# Patient Record
Sex: FEMALE | Race: WHITE | Employment: FULL TIME | URBAN - METROPOLITAN AREA
[De-identification: names, ages, dates, MRNs, and addresses within clinical notes are randomized per-mention and may not be internally consistent; named-entity substitution may affect disease eponyms.]

---

## 2019-02-05 ENCOUNTER — OFFICE VISIT (OUTPATIENT)
Dept: FAMILY MEDICINE CLINIC | Age: 44
End: 2019-02-05

## 2019-02-05 VITALS
SYSTOLIC BLOOD PRESSURE: 116 MMHG | RESPIRATION RATE: 16 BRPM | HEIGHT: 67 IN | OXYGEN SATURATION: 99 % | BODY MASS INDEX: 22.13 KG/M2 | HEART RATE: 72 BPM | TEMPERATURE: 97.1 F | DIASTOLIC BLOOD PRESSURE: 73 MMHG | WEIGHT: 141 LBS

## 2019-02-05 DIAGNOSIS — G89.29 CHRONIC BILATERAL LOW BACK PAIN WITHOUT SCIATICA: Primary | ICD-10-CM

## 2019-02-05 DIAGNOSIS — M70.62 TROCHANTERIC BURSITIS OF LEFT HIP: ICD-10-CM

## 2019-02-05 DIAGNOSIS — N92.1 METRORRHAGIA: ICD-10-CM

## 2019-02-05 DIAGNOSIS — Z13.220 SCREENING CHOLESTEROL LEVEL: ICD-10-CM

## 2019-02-05 DIAGNOSIS — M54.50 CHRONIC BILATERAL LOW BACK PAIN WITHOUT SCIATICA: Primary | ICD-10-CM

## 2019-02-05 RX ORDER — PREDNISONE 20 MG/1
TABLET ORAL
Qty: 21 TAB | Refills: 0 | Status: SHIPPED | OUTPATIENT
Start: 2019-02-05

## 2019-02-05 NOTE — PROGRESS NOTES
Subjective:     HPI:  David Ruffin is a new patient who presents today to establish care. Medical problems: allergies, chronic back pain, hip pain  Last visit with PCP: about 5 years ago  Last labs: about 5 years ago  Last PAP: 9 years ago  Hx of abnormal pap smears: none  Patient's last menstrual period was 01/29/2019. Family history of GYN cancer: mother had benign tumors in her vagina  Family history of breast cancer: maternal aunt (diagnosed around age 48), maternal great grandmother  Last mammogram: none  Family history of colon cancer: none  Last colonoscopy: none  Tobacco use: pt reports that she smoked 0.5 pack per day and switched to vaping 5 years ago  Drug use: smokes marijuana every day  Alcohol use: drinks 3 beers 2 times per week    Patient complains of left hip pain and lower back pain. Left hip pain: Pt reports that she has been experiencing left hip pain recently. She states that she has a lump on her left hip that is painful. She reports that the pain radiates down her glute and hamstring to her knee. Lower back pain: Pt complains of chronic pain that started about 10 years ago. She states that she manages her back pain herself. She reports that when she stands or sits for an extended period of time her back pain is aggravated. She reports that her mother also has back pain. Referred to physical therapy. Of note, pt reports that she is physically active. She states that she lifts weights, does pilates, and rides her bike. Blood work ordered today. Current Outpatient Medications   Medication Sig Dispense Refill    Cetirizine (ZYRTEC) 10 mg cap Take  by mouth.  fluticasone propionate (FLONASE ALLERGY RELIEF NA) by Nasal route.  predniSONE (DELTASONE) 20 mg tablet 3 tabs daily for 3 days, 2 tabs daily for 3 days, 1 tab daily for 3 days, 1/2 tab daily for 4 days. 21 Tab 0        No Known Allergies    No past medical history on file.      No past surgical history on file.    No family history on file. Social History     Socioeconomic History    Marital status:      Spouse name: Not on file    Number of children: Not on file    Years of education: Not on file    Highest education level: Not on file   Social Needs    Financial resource strain: Not on file    Food insecurity - worry: Not on file    Food insecurity - inability: Not on file    Transportation needs - medical: Not on file   [place] needs - non-medical: Not on file   Occupational History    Not on file   Tobacco Use    Smoking status: Former Smoker     Packs/day: 0.50     Years: 25.00     Pack years: 12.50     Types: Cigarettes    Smokeless tobacco: Never Used    Tobacco comment: patient vapes   Substance and Sexual Activity    Alcohol use: Yes     Frequency: 2-4 times a month     Drinks per session: 3 or 4     Binge frequency: Never    Drug use: Yes     Frequency: 7.0 times per week     Types: Marijuana    Sexual activity: Yes     Partners: Male     Birth control/protection: None   Other Topics Concern    Not on file   Social History Narrative    Not on file       REVIEW OF SYSTEM:  Review of Systems   Constitutional: Negative for chills and fever. Eyes: Negative for blurred vision. Respiratory: Negative for shortness of breath. Cardiovascular: Negative for chest pain, palpitations and leg swelling. Gastrointestinal: Negative for constipation, diarrhea, nausea and vomiting. Musculoskeletal: Positive for back pain and joint pain. Neurological: Negative for headaches. Objective:     Visit Vitals  /73 (BP 1 Location: Left arm, BP Patient Position: Sitting)   Pulse 72   Temp 97.1 °F (36.2 °C) (Oral)   Resp 16   Ht 5' 6.5\" (1.689 m)   Wt 141 lb (64 kg)   LMP 01/29/2019   SpO2 99%   BMI 22.42 kg/m²       PHYSICAL EXAM:  Physical Exam   Constitutional: She is oriented to person, place, and time and well-developed, well-nourished, and in no distress.    HENT:   Right Ear: Tympanic membrane, external ear and ear canal normal.   Left Ear: Tympanic membrane, external ear and ear canal normal.   Nose: Nose normal.   Mouth/Throat: Oropharynx is clear and moist.   Eyes: Pupils are equal, round, and reactive to light. Neck: Normal range of motion. Neck supple. No thyromegaly present. Cardiovascular: Normal rate, regular rhythm and intact distal pulses. Murmur heard. Pulmonary/Chest: Effort normal and breath sounds normal. She has no wheezes. Musculoskeletal:        Left hip: She exhibits swelling (bursa). Neurological: She is alert and oriented to person, place, and time. Skin: Skin is warm and dry. Vitals reviewed. Assessment/Plan:       ICD-10-CM ICD-9-CM    1. Chronic bilateral low back pain without sciatica M54.5 724.2 XR SPINE LUMB 2 OR 3 V    G89.29 338.29 REFERRAL TO PHYSICAL THERAPY      predniSONE (DELTASONE) 20 mg tablet   2. Trochanteric bursitis of left hip M70.62 726.5 REFERRAL TO PHYSICAL THERAPY      predniSONE (DELTASONE) 20 mg tablet   3. Screening cholesterol level Z13.220 V77.91 LIPID PANEL      METABOLIC PANEL, COMPREHENSIVE   4. Metrorrhagia N92.1 626.6 CBC WITH AUTOMATED DIFF     Patient given opportunity to ask questions. Questions answered. Pt advised to ice her left hip after exercise. Patient understands plan of care. Follow-up Disposition:  Return in about 6 weeks (around 3/19/2019) for well woman with PAP. Caron Nelson Written by Sunny Gillespie, as dictated by Toshia Hearn DO.    I, Dr. Toshia Hearn, confirm that all documentation is accurate.

## 2019-02-05 NOTE — PROGRESS NOTES
Blaise Ortega is a 44 y.o. female  Chief Complaint   Patient presents with    Establish Care    Hip Pain     left     1. Have you been to the ER, urgent care clinic since your last visit? Hospitalized since your last visit? No    2. Have you seen or consulted any other health care providers outside of the [address] [place] [address] since your last visit? Include any pap smears or colon screening.  No

## 2019-02-06 LAB
ALBUMIN SERPL-MCNC: 4.6 G/DL (ref 3.5–5.5)
ALBUMIN/GLOB SERPL: 2.1 {RATIO} (ref 1.2–2.2)
ALP SERPL-CCNC: 47 IU/L (ref 39–117)
ALT SERPL-CCNC: 11 IU/L (ref 0–32)
AST SERPL-CCNC: 14 IU/L (ref 0–40)
BASOPHILS # BLD AUTO: 0 X10E3/UL (ref 0–0.2)
BASOPHILS NFR BLD AUTO: 0 %
BILIRUB SERPL-MCNC: 0.5 MG/DL (ref 0–1.2)
BUN SERPL-MCNC: 9 MG/DL (ref 6–24)
BUN/CREAT SERPL: 15 (ref 9–23)
CALCIUM SERPL-MCNC: 9.2 MG/DL (ref 8.7–10.2)
CHLORIDE SERPL-SCNC: 105 MMOL/L (ref 96–106)
CHOLEST SERPL-MCNC: 168 MG/DL (ref 100–199)
CO2 SERPL-SCNC: 21 MMOL/L (ref 20–29)
CREAT SERPL-MCNC: 0.6 MG/DL (ref 0.57–1)
EOSINOPHIL # BLD AUTO: 0 X10E3/UL (ref 0–0.4)
EOSINOPHIL NFR BLD AUTO: 1 %
ERYTHROCYTE [DISTWIDTH] IN BLOOD BY AUTOMATED COUNT: 13 % (ref 12.3–15.4)
GLOBULIN SER CALC-MCNC: 2.2 G/DL (ref 1.5–4.5)
GLUCOSE SERPL-MCNC: 91 MG/DL (ref 65–99)
HCT VFR BLD AUTO: 39 % (ref 34–46.6)
HDLC SERPL-MCNC: 61 MG/DL
HGB BLD-MCNC: 13.7 G/DL (ref 11.1–15.9)
IMM GRANULOCYTES # BLD AUTO: 0 X10E3/UL (ref 0–0.1)
IMM GRANULOCYTES NFR BLD AUTO: 0 %
LDLC SERPL CALC-MCNC: 93 MG/DL (ref 0–99)
LYMPHOCYTES # BLD AUTO: 1.2 X10E3/UL (ref 0.7–3.1)
LYMPHOCYTES NFR BLD AUTO: 24 %
MCH RBC QN AUTO: 31.5 PG (ref 26.6–33)
MCHC RBC AUTO-ENTMCNC: 35.1 G/DL (ref 31.5–35.7)
MCV RBC AUTO: 90 FL (ref 79–97)
MONOCYTES # BLD AUTO: 0.3 X10E3/UL (ref 0.1–0.9)
MONOCYTES NFR BLD AUTO: 5 %
NEUTROPHILS # BLD AUTO: 3.6 X10E3/UL (ref 1.4–7)
NEUTROPHILS NFR BLD AUTO: 70 %
PLATELET # BLD AUTO: 315 X10E3/UL (ref 150–379)
POTASSIUM SERPL-SCNC: 4.7 MMOL/L (ref 3.5–5.2)
PROT SERPL-MCNC: 6.8 G/DL (ref 6–8.5)
RBC # BLD AUTO: 4.35 X10E6/UL (ref 3.77–5.28)
SODIUM SERPL-SCNC: 143 MMOL/L (ref 134–144)
SPECIMEN STATUS REPORT, ROLRST: NORMAL
TRIGL SERPL-MCNC: 69 MG/DL (ref 0–149)
VLDLC SERPL CALC-MCNC: 14 MG/DL (ref 5–40)
WBC # BLD AUTO: 5.1 X10E3/UL (ref 3.4–10.8)

## 2019-02-13 ENCOUNTER — HOSPITAL ENCOUNTER (OUTPATIENT)
Dept: PHYSICAL THERAPY | Age: 44
Discharge: HOME | End: 2019-02-13
Payer: COMMERCIAL

## 2019-02-13 PROCEDURE — 97110 THERAPEUTIC EXERCISES: CPT

## 2019-02-13 PROCEDURE — 97162 PT EVAL MOD COMPLEX 30 MIN: CPT

## 2019-02-13 NOTE — PROGRESS NOTES
[address] [facility] PHYSICAL THERAPY 
[address] [facility] [address], [place], [address] - Phone: [phone]  Fax: [phone] PLAN OF CARE / STATEMENT OF MEDICAL NECESSITY FOR PHYSICAL THERAPY SERVICES Patient Name: Sherman Frazier : 1975 Medical  
Diagnosis: Low back pain [M54.5] Trochanteric bursitis of left hip [M70.62] Treatment Diagnosis: Low back pain [M54.5] Trochanteric bursitis of left hip [M70.62] Onset Date: Low back pain - chronic; hip pain - 2 months ago Referral Source: Andrew Perdue DO Start of Care [facility]): 2019 Prior Hospitalization: See medical history Provider #: [id number] Prior Level of Function: Independent with ADLs, ambulation; working; exercising regularly (yoga, pilates, biking, hiking) Comorbidities: H/o breast implants Medications: Verified on Patient Summary List  
The Plan of Care and following information is based on the information from the initial evaluation.  
=========================================================================================== Assessment / key information:  Patient is a 44 y.o. female who presents with complaints of chronic low back pain, recent (~ 2 months ago) onset of B hip pain (left > right). Patient reports that she had swelling over trochanteric bursa, which improved with oral steroids. Patient demonstrates pain with L/S ROM, decreased core/B hip strength, decreased B hip flexibility, pelvic obliquities (right anterior innominate rotation) and impaired position/activity tolerance. Patient would benefit from skilled PT services to address these issues and improve function. Thank you for this referral. 
 
FOTO: 57/100, stage 4, little difficulty performing usual work or household activities ========================================================================================== Eval Complexity: History: LOW Complexity : Zero comorbidities / personal factors that will impact the outcome / POCExam:HIGH Complexity : 4+ Standardized tests and measures addressing body structure, function, activity limitation and / or participation in recreation  Presentation: MEDIUM Complexity : Evolving with changing characteristics  Clinical Decision Making:MEDIUM Complexity : FOTO score of 26-74Overall Complexity:MEDIUM Problem List: pain affecting function, decrease ROM, decrease strength, impaired gait/ balance, decrease ADL/ functional abilitiies, decrease activity tolerance, decrease flexibility/ joint mobility and decrease transfer abilities Treatment Plan may include any combination of the following: Therapeutic exercise, Therapeutic activities, Neuromuscular re-education, Physical agent/modality, Gait/balance training, Manual therapy, Patient education, Functional mobility training, Home safety training and Stair training Patient / Family readiness to learn indicated by: asking questions, trying to perform skills and interest 
Persons(s) to be included in education: patient (P) Barriers to Learning/Limitations: None Measures taken:   
Patient Goal (s): \"Ease lower back pain, exercise correctly, sit correctly. \"  
Patient self reported health status: good Rehabilitation Potential: good ? Short Term Goals: To be accomplished in  2-3  weeks: 1. Patient will demonstrate compliance with HEP. 2. Patient will report less than or equal to 4/10 pain at worst to allow increased activity tolerance. ? Long Term Goals: To be accomplished in  4-6  weeks: 1. Patient will demonstrate independence with HEP. 2. Patient will score greater than or equal to 69/100 (stage 4, little difficulty performing usual work or household activities) on FOTO to indicate improved function. 3. Patient will demonstrate B glut max strength > 5/5 in to facilitate gait and functional mobility. Frequency / Duration:   Patient to be seen  2  times per week for 4-6  weeks: Patient / Caregiver education and instruction: activity modification and exercises Therapist Signature: Dez Haddad PT Date: 2/13/2019 Certification Period: NA Time: 6:12 PM  
=========================================================================================== I certify that the above Physical Therapy Services are being furnished while the patient is under my care. I agree with the treatment plan and certify that this therapy is necessary. Physician Signature:       Date:      Time:  Please sign and return to [facility] or you may fax the signed copy to [phone]. Thank you.

## 2019-02-13 NOTE — PROGRESS NOTES
PHYSICAL THERAPY - DAILY TREATMENT NOTE Patient Name: Janine Steele        Date: 2019 : 1975   YES Patient  Verified Visit #:   1     Insurance: Payor: Prateek Ruiz / Plan: Med David / Product Type: HMO /   
 
In time: 4:00 Out time: 5:00 Total Treatment Time: 60 Medicare/BCBS [place] Time Tracking (below) Total Timed Codes (min):  25 1:1 Treatment Time:  25 TREATMENT AREA =  LBP 
SUBJECTIVE Pain Level (on 0 to 10 scale):  6  / 10 Left SI/buttock Medication Changes/New allergies or changes in medical history, any new surgeries or procedures? NO    If yes, update Summary List  
Subjective Functional Status/Changes:  []  No changes reported Pt reports LBP for 10+ years. Pt reports gradual onset, states that she thinks it may have been exercise-related (core exercises) and sitting for prolonged periods at work. Pt reports that she began having hip pain (left>right) ~ 2 months ago, which became progressively worse. Pt reports that she had swelling over bursa, which has improved since she had dose of oral steroids. Pt denies numbness/tingling/weakness. Pt reports intermittent pain B gluts/hamstrings, but reports that she does not have pain below knees. Pt reports that prolonged sitting or standing increase pain. Pt reports temporary relief with massage. Pt reports that she went to chiropractor for ~ 5 years, got some relief but no longer gets relief. OBJECTIVE Physical Therapy Evaluation - Lumbar Spine ([facility]) SUBJECTIVE Chief Complaint: See above Mechanism of injury: See above Symptoms: 
Pain rating (0-10): Today: 6 Best: 0 Worst: 10 
 [] Constant:  
 [] Intermittent: 
  
Aggravated by: 
 [x] Bending [x] Sitting [x] Standing [] Walking 
 [] Moving [] Cough [] Sneeze [] Valsalva 
 [] AM  [] PM  Lying:  [x] sup   [] pro   [] sidelying 
 [] Other: 
  
Eased by:  
 [] Bending [] Sitting [] Standing [x] Walking [x] Moving [] AM  [x] PM  Lying: [] sup  [x] pro  [x] sidelying 
 [x] Other: exercise, massage General Health:  Red Flags Indicated? [] Yes    [x] No 
[] Yes [] No Recent weight change (If yes, due to dieting? [] Yes  [] No) [] Yes [] No Weakness in legs during walking 
[] Yes [] No Unremitting pain at night 
[] Yes [] No Abdominal pain or problems 
[] Yes [] No Rectal bleeding 
[] Yes [] No Feet more cold or painful in cold weather 
[] Yes [] No Menstrual irregularities 
[] Yes [] No Blood or pain with urination 
[] Yes [] No Dysfunction of bowel or bladder 
[] Yes [] No Recent illness within past 3 weeks (i.e, cold, flu) 
[] Yes [] No Numbness/tingling in buttock/genitalia region Past History/Treatments: Chiropractic Diagnostic Tests: [] Lab work [] X-rays    [] CT [] MRI     [] Other: 
Results: None Functional Status Prior level of function: Working (desk job), exercising (pilates, yoga, biking, hiking - swimming, kayaking in summer) Present functional limitations: Pain with prolonged sitting What position do you sleep in?: Sidelying or prone OBJECTIVEPosture: 
Lateral Shift: [] R    [] L     [] +  [x] - Kyphosis: [x] Increased [] Decreased   []  WNL Lordosis:  [x] Increased [] Decreased   [] WNL Pelvic symmetry: [] WNL    [x] Other: See below Gait:  [x] Normal     [] Abnormal: 
 
Active Movements: [] N/A   [] Too acute   [] Other: ROM % AROM % PROM Comments:pain, area Forward flexion 40-60 WFL  LBP Extension 20-30 WFL  LBP  
SB right 20-30 WFL  Stretching sensation left trunk SB left 20-30 WFL  Stretching sensation right trunk Rotation right 5-10 WFL  Stretching sensation left trunk Rotation left 5-10 WFL  Stretching sensation right trunk Repeated Movements Effects on present pain: produces (ID), abolishes (A), increases (incr), decreases (decr), centralizes (C), peripheral (PH), no effect (NE)  Pre-Test Sx Flexion Repeated Flexion Extension Repeated Extension Repeated SBL Repeated SBR Sitting Standing Lying      N/A N/A Comments: 
Side Glide: 
Sustained passive positioning test: 
 
Neuro Screen [x] WNL Myotome/Dermatome/Reflexes: 
Comments: 
 
Dural Mobility: SLR Sitting: [] R    [] L    [] +    [] -  @ (degrees):  
        Supine: [] R    [] L    [] +    [x] -  @ (degrees):  
Slump Test: [] R    [] L    [] +    [] -  @ (degrees): Prone Knee Bend: [] R    [] L    [] +    [] -  
 
Palpation [] Min  [] Mod  [] Severe    Location: 
[] Min  [] Mod  [] Severe    Location: 
[] Min  [] Mod  [] Severe    Location: 
 
Stabilization Tests Multifidus Test 
Level 1: Prone abdominal draw in (Goal 6-10mmHG): 
Level 2: Supported leg load supine (needle deflection at 40mmHG): [] Yes  [] No  
Level 3: Unsupported leg load supine (needle deflection at 40mmHG): [] Yes  [] No 
  
Strength 
 L(0-5) R (0-5) N/T Hip Flexion (L1,2) 4 4 [] Knee Extension (L3,4) 5 5 [] Ankle Dorsiflexion (L4) 5 5 [] Great Toe Extension (L5) 5 5 [] Ankle Plantarflexion (S1) 5 5 [] Knee Flexion (S1,2) 5 5 [] Upper Abdominals   [] Lower Abdominals   [] Paraspinals   [] Back Rotators   [] Gluteus Lc 4 4 [] Other (Glut Med) 4 5 [] Special Tests Lumbar:  Lumb. Compression: [] Pos  [] Neg            
  Lumbar Distraction:   [] Pos  [] Neg 
  Quadrant:  [] Pos  [] Neg   [] Flex  [] Ext Sacroilliac:  Gaenslen's: [] R    [] L    [] +    [] -  
  Compression: [] +    [] -  
  Gapping:  [] +    [] - Thigh Thrust: [] R    [] L    [] +    [] - Leg Length: [] +    [] -   Position: 
  Crests: 
  ASIS: Left higher in supine PSIS: Left lower in prone Sacral Sulcus: 
  Mobility: Standing flex: + right Sitting flex: + right Supine to sit: Left LE longer in supine to shorter in long sit Prone knee bend: 
 
     Hip: Doe Miracle:  [] R    [] L    [] +    [] - Scour:  [] R    [] L    [] +    [] -   Piriformis: [] R    [x] L    [x] +    [] -  
 
 Deficits: Sadie's: [x] R    [x] L    [x] +    [] - Jr: [x] R    [x] L    [x] +    [] - Hamstrings 90/90: WNL  Gastrocsoleus (to neutral): Right: Left: 
    
Global Muscular Weakness: 
Abdominals: 
Quadratus Lumborum: 
Paraspinals: Other: 
 
Other tests/comments: 
Stork + right 25 min Therapeutic Exercise:  [x]  See flow sheet Rationale:      increase ROM, increase strength, improve coordination, improve balance and increase proprioception to improve the patients ability to perform ADLs/IADLs, functional mobility and gait safely and independently without increased pain/symptoms During TE min Patient Education:  YES  Reviewed HEP [x]  Progressed/Changed HEP based on:   Initiated HEP (copy in chart) Other Objective/Functional Measures: 
 
See eval 
 
Pubic clearing technique, MET for correction of right anterior innominate rotation, self MET for correction of right anterior innominate rotation - improved but not completely corrected innominate rotation afterward H/L hip abd/add, piriformis stretch, REIL, JAM Post Treatment Pain Level (on 0 to 10) scale:   5  / 10 ASSESSMENT Assessment/Changes in Function:  
See plan of care Justification for Eval Code Complexity: 
Patient History : LOW Examination HIGH - See objective Clinical Presentation: MEDIUM Clinical Decision Making : MEDIUM - FOTO 57/100 (lumbar spine), 59/100 (hip) []  See Progress Note/Recertification Patient will continue to benefit from skilled PT services: see plan of care Progress toward goals / Updated goals: 
See plan of care PLAN [x]  Upgrade activities as tolerated YES Continue plan of care  
[]  Discharge due to :   
[]  Other:   
 
Therapist: Anahi Canales PT Date: 2/13/2019 Time: 4:00 PM

## 2019-02-18 ENCOUNTER — HOSPITAL ENCOUNTER (OUTPATIENT)
Dept: PHYSICAL THERAPY | Age: 44
Discharge: HOME | End: 2019-02-18
Payer: COMMERCIAL

## 2019-02-18 PROCEDURE — 97110 THERAPEUTIC EXERCISES: CPT

## 2019-02-18 NOTE — PROGRESS NOTES
PHYSICAL THERAPY - DAILY TREATMENT NOTE Patient Name: Melita Caballero        Date: 2019 : 1975   YES Patient  Verified Visit #:   2   of     Insurance: Payor: James Tolentino / Plan: Leeanna Cano / Product Type: HMO /   
 
In time: 8:28 Out time: 9:10 Total Treatment Time: 42 Medicare/BCBS [place] Time Tracking (below) Total Timed Codes (min):  42 1:1 Treatment Time:  42 TREATMENT AREA =  Low back pain [M54.5] Trochanteric bursitis, left hip [M70.62] SUBJECTIVE Pain Level (on 0 to 10 scale):  5  / 10 Medication Changes/New allergies or changes in medical history, any new surgeries or procedures? NO    If yes, update Summary List  
Subjective Functional Status/Changes:  []  No changes reported Pt reports constant LBP. Pt reports that she had left lateral hip pain last night while sleeping (which she attributes to lying on left hip) and while riding stationary bike at home this morning. OBJECTIVE 42 min Therapeutic Exercise:  [x]  See flow sheet Rationale:      increase ROM and increase strength to improve the patients ability to perform ADLs/IADLs, functional mobility and gait safely and independently without increased pain/symptoms During TE min Patient Education:  YES  Reviewed HEP [x]  Progressed/Changed HEP based on:   Cont HEP; added several new exercises (copy in chart) Other Objective/Functional Measures: 
 
Initiated bike, stand hip 3-way, QL stretch at step, bridge with ball, bridge with band, LTR with core, H/L toe taps # 1, 90/90 HS stretch, mod Jr stretch No innominate rotation apparent this session Patient reported pressure lower back and B SI region; patient reported NE with REIL, JAM or pubic clearing technique Post Treatment Pain Level (on 0 to 10) scale:    10 ASSESSMENT Assessment/Changes in Function: Tolerated exercises with minimal c/o increased pain 
  
[]  See Progress Note/Recertification Patient will continue to benefit from skilled PT services to modify and progress therapeutic interventions, address functional mobility deficits, address ROM deficits, address strength deficits, analyze and address soft tissue restrictions, analyze and cue movement patterns, analyze and modify body mechanics/ergonomics, assess and modify postural abnormalities and instruct in home and community integration to attain remaining goals. Progress toward goals / Updated goals: 
Progressing toward goals: · Short Term Goals: To be accomplished in  2-3  weeks: 1. Patient will demonstrate compliance with HEP. - Reports compliance 2. Patient will report less than or equal to 4/10 pain at worst to allow increased activity tolerance. · Long Term Goals: To be accomplished in  4-6  weeks: 1. Patient will demonstrate independence with HEP. 2. Patient will score greater than or equal to 69/100 (stage 4, little difficulty performing usual work or household activities) on FOTO to indicate improved function. 3. Patient will demonstrate B glut max strength > 5/5 in to facilitate gait and functional mobility. PLAN [x]  Upgrade activities as tolerated YES Continue plan of care  
[]  Discharge due to :   
[]  Other:   
 
Therapist: Zac Light PT Date: 2/18/2019 Time: 8:26 AM  
 
Future Appointments Date Time Provider Adolfo Silva 2/18/2019  8:30 AM Summer Blevins PT [facility]  
2/27/2019  8:00 AM Summer Blevins PT [facility]  
3/20/2019  8:20 AM Janice Ovalles DO [facility] SCHED

## 2019-02-27 ENCOUNTER — HOSPITAL ENCOUNTER (OUTPATIENT)
Dept: PHYSICAL THERAPY | Age: 44
Discharge: HOME | End: 2019-02-27
Payer: COMMERCIAL

## 2019-02-27 PROCEDURE — 97110 THERAPEUTIC EXERCISES: CPT

## 2019-02-27 NOTE — PROGRESS NOTES
PHYSICAL THERAPY - DAILY TREATMENT NOTE Patient Name: Melita Caballero        Date: 2019 : 1975   YES Patient  Verified Visit #:   3   of     Insurance: Payor: James Tolentino / Plan: Leeanna Cano / Product Type: HMO /   
 
In time: 8:03 Out time: 8:41 Total Treatment Time: 45 Medicare/BCBS [place] Time Tracking (below) Total Timed Codes (min):  38 1:1 Treatment Time:  45 TREATMENT AREA =  Low back pain [M54.5] Trochanteric bursitis, left hip [M70.62] SUBJECTIVE Pain Level (on 0 to 10 scale):  5  / 10 Medication Changes/New allergies or changes in medical history, any new surgeries or procedures? NO    If yes, update Summary List  
Subjective Functional Status/Changes:  []  No changes reported Pt reports that she continues to have a dull ache in lumbar region, but that she has noticed that she is having less pain with JAM. Pt reports that modified Jr stretch does not work on her bed because it is too low. OBJECTIVE 38 min Therapeutic Exercise:  [x]  See flow sheet Rationale:      increase ROM and increase strength to improve the patients ability to perform ADLs/IADLs, functional mobility and gait safely and independently without increased pain/symptoms During TE min Patient Education:  YES  Reviewed HEP [x]  Progressed/Changed HEP based on:   Cont HEP; added new exercises (copy in chart); advised to alternate SLR 3-way and standing hip 3-way Other Objective/Functional Measures: 
 
Exercises per flowsheet Initiated clam, SLR 3-way Post Treatment Pain Level (on 0 to 10) scale:   5  / 10 ASSESSMENT Assessment/Changes in Function: Tolerated exercises without c/o increased pain Decreased ROM with REIL 
  
[]  See Progress Note/Recertification Patient will continue to benefit from skilled PT services to modify and progress therapeutic interventions, address functional mobility deficits, address ROM deficits, address strength deficits, analyze and address soft tissue restrictions, analyze and cue movement patterns, analyze and modify body mechanics/ergonomics, assess and modify postural abnormalities and instruct in home and community integration to attain remaining goals. Progress toward goals / Updated goals: 
Progressing toward goals: · Short Term Goals: To be accomplished in  2-3  weeks: 1. Patient will demonstrate compliance with HEP. - Reports compliance with HEP 2. Patient will report less than or equal to 4/10 pain at worst to allow increased activity tolerance. · Long Term Goals: To be accomplished in  4-6  weeks: 1. Patient will demonstrate independence with HEP. 2. Patient will score greater than or equal to 69/100 (stage 4, little difficulty performing usual work or household activities) on FOTO to indicate improved function. 3. Patient will demonstrate B glut max strength > 5/5 in to facilitate gait and functional mobility. - Added prone hip ext 
   
PLAN [x]  Upgrade activities as tolerated YES Continue plan of care  
[]  Discharge due to :   
[]  Other:   
 
Therapist: Alexa Brown PT Date: 2/27/2019 Time: 8:09 AM  
 
Future Appointments Date Time Provider Adolfo Silva 3/4/2019  8:00 AM [facility] AV 1 [facility]  
3/13/2019  8:00 AM Rene Torres PT [facility]  
3/18/2019  8:30 AM Rene Torres PT [facility]  
3/20/2019  8:20 AM Keyon Ovalles DO [facility]  
3/27/2019  8:00 AM Rene Torres PT [facility]

## 2019-03-04 ENCOUNTER — HOSPITAL ENCOUNTER (OUTPATIENT)
Dept: PHYSICAL THERAPY | Age: 44
Discharge: HOME | End: 2019-03-04
Payer: COMMERCIAL

## 2019-03-04 PROCEDURE — 97140 MANUAL THERAPY 1/> REGIONS: CPT

## 2019-03-04 PROCEDURE — 97110 THERAPEUTIC EXERCISES: CPT

## 2019-03-04 NOTE — PROGRESS NOTES
PHYSICAL THERAPY - DAILY TREATMENT NOTE Patient Name: Arie Smith        Date: 3/4/2019 : 1975   yes Patient  Verified Visit #:   4     Insurance: Payor: BLUE CROSS / Plan: Guillermo Dimas / Product Type: HMO /   
 
In time: 800 Out time: 9:10 Total Treatment Time: 70 Medicare/[facility] Time Tracking (below) Total Timed Codes (min):  66 1:1 Treatment Time:  64 TREATMENT AREA =  Low back pain [M54.5] Trochanteric bursitis, left hip [M70.62] SUBJECTIVE Pain Level (on 0 to 10 scale):   10 Medication Changes/New allergies or changes in medical history, any new surgeries or procedures?    no  If yes, update Summary List  
Subjective Functional Status/Changes:  []  No changes reported Pt states pain is a little better. Notes ongoing \"bump\" at left lateral hip. Reports compliance with HEP. OBJECTIVE 
51 
(41) min Therapeutic Exercise:  [x]  See flow sheet Rationale:         increase ROM and increase strength to improve the patients ability to perform ADLs/IADLs, functional mobility and gait safely and independently without increased pain/symptoms 15 min Manual Therapy:  Prone b/l hip IR stretch, hip flexor stretch. METs for R ant innominate Rationale: decrease pain, increase ROM and increase tissue extensibility to tolerate standing/walking activities Billed With/As: 
 [x] TE 
 [] TA 
 [] Neuro 
 [] Self Care Patient Education: [x] Review HEP [] Progressed/Changed HEP based on:  
[] positioning   [] body mechanics   [] transfers   [] heat/ice application   
[] other:   
Other Objective/Functional Measures: 
 
SI joint check:  R ant rotated innominate 
 
-increased reps with hip hike 
-cues to increase TA activation with SLR flexion, 90-90 toe taps (poor+ stability with toe taps) 
-cues for form with S/L abd 
  
Post Treatment Pain Level (on 0 to 10) scale:   5  / 10 ASSESSMENT Assessment/Changes in Function: Pt reports fatigue without adverse signs/sx's after exercises indicating appropriate intensity. Difficult to correct SI obliquity, requiring several rounds of METs and alternate s/l technique before improved position, however unable to completely level pelvis. Pt advised on need to continue with hip flexor stretching and progressive core strengthening to promote goal attainment. []  See Progress Note/Recertification Patient will continue to benefit from skilled PT services to modify and progress therapeutic interventions, address functional mobility deficits, address ROM deficits, address strength deficits, analyze and address soft tissue restrictions, analyze and cue movement patterns and analyze and modify body mechanics/ergonomics to attain remaining goals. Progress toward goals / Updated goals: 
Reviewed 3/4: progressing towards pain goal and continues to report compliance with HEP · Short Term Goals: To be accomplished in  2-3  weeks: 1. Patient will demonstrate compliance with HEP.  - Reports compliance with HEP 2. Patient will report less than or equal to 4/10 pain at worst to allow increased activity tolerance. · Long Term Goals: To be accomplished in  4-6  weeks: 1. Patient will demonstrate independence with HEP. 2. Patient will score greater than or equal to 69/100 (stage 4, little difficulty performing usual work or household activities) on FOTO to indicate improved function. 3. Patient will demonstrate B glut max strength > 5/5 in to facilitate gait and functional mobility. - Added prone hip ext PLAN 
[]  Upgrade activities as tolerated yes Continue plan of care  
[]  Discharge due to :   
[]  Other:   
 
Therapist: Maren Carpio PT Date: 3/4/2019 Time: 7:56 AM  
 
Future Appointments Date Time Provider Adolfo Silva 3/4/2019  8:00 AM [facility] [address] [facility]  
3/13/2019  8:00 AM Melissa Kirkland PT [facility]  
 3/18/2019  8:30 AM Sara Bueno PT [facility]  
3/20/2019  8:20 AM [facility]  
3/27/2019  8:00 AM Sara Bueno, PT [facility]

## 2019-03-13 ENCOUNTER — HOSPITAL ENCOUNTER (OUTPATIENT)
Dept: PHYSICAL THERAPY | Age: 44
Discharge: HOME | End: 2019-03-13
Payer: COMMERCIAL

## 2019-03-13 PROCEDURE — 97110 THERAPEUTIC EXERCISES: CPT

## 2019-03-13 PROCEDURE — 97140 MANUAL THERAPY 1/> REGIONS: CPT

## 2019-03-13 PROCEDURE — 97530 THERAPEUTIC ACTIVITIES: CPT

## 2019-03-13 NOTE — PROGRESS NOTES
[address] [facility] PHYSICAL THERAPY  [address] [place] [address] S[place] [place], [address] - Phone: [phone]  Fax: [phone]  PROGRESS NOTE  Patient Name: Nohemy Yeboah : 1975   Treatment/Medical Diagnosis: Low back pain [M54.5]  Trochanteric bursitis, left hip [M70.62]   Referral Source: Ly Florentino DO     Date of Initial Visit: 2019 Attended Visits: 5 Missed Visits: 0     SUMMARY OF TREATMENT  Patient has been participating in a skilled outpatient physical therapy program consisting of therapeutic exercise, manual therapy, patient education, and a personalized home exercise program, to increase decrease/abolish pain and symptoms, and increased strength and range of motion, to facilitate safety with gait and functional independence with ADLs/IADLs and recreational activities,     CURRENT STATUS  FOTO: 65/100 (lumbar spine - stage 4: pt exhibits little difficulty performing usual work or household activities and hobbites); 63/100 (hip - stage 4: pt exhibits little difficulty performing usual work or household activities and hobbites)    Hip flexion strength 4/5 bilaterally; all other lower extremity strength 5/5  Sadie test (-) bilaterally  Jr test (+) bilaterally  Piriformis test (+) bilaterally    Goal/Measure of Progress Goal Met? 1. Patient will demonstrate compliance with HEP. Status at last Eval: NA Current Status: Met - compliant with HEP yes   2. Patient will report less than or equal to 4/10 pain at worst to allow increased activity tolerance. Status at last Eval: 10/10 at worst Current Status: 7/10 at worst Progressing toward goal - pt reports recent exacerbation of 6-7/10 over the weekend   3. Patient will demonstrate B glut max strength > 5/5 in to facilitate gait and functional mobility   Status at last Eval: 4/5 B Current Status: 5/5 B yes   4.   Patient will score greater than or equal to 69/100 (stage 4, little difficulty performing usual work or household activities) on FOTO to indicate improved function. Status at last Eval: FOTO (lumbar spine) 57/100   FOT (hip) 59/100 Current Status: FOTO (lumbar spine) 65/100  FOTO (hip) 63/100 Progressing toward goal - pt scored 65/100 (lumbar spine) and 63/100 (hip)     New Goals to be achieved in __4__  weeks:  1. Patient will demonstrate increased hip flexibility with negative Jr tests bilaterally to facilitate decreased/abolished low back pain. 2.  Patient will demonstrate increased hip flexibility with negative piriformis tests bilaterally to facilitate increased mobility with ADLs/IADLs, and decrease/abolish low back pain   3. Patient will report less than or equal to 4/10 pain at worst to allow increased activity tolerance. 4.  Patient will score greater than or equal to 69/100 (stage 4, little difficulty performing usual work or household activities) on FOTO to indicate improved function. G-Codes: N/A  RECOMMENDATIONS  Continue with skilled outpatient physical therapy for 4 more visits to increase flexibility, increase ROM, increase strength, decrease/abolish pain, to facilitate safe and functional independence with gait and ADLs/IADLs. .    If you have any questions/comments please contact us directly at [phone]. Thank you for allowing us to assist in the care of your patient. Therapist Signature: ALYSHA Biggs Date: 3/13/2019    Brandan Aquino PT Time: 9:13 AM   NOTE TO PHYSICIAN:  PLEASE COMPLETE THE ORDERS BELOW AND FAX TO   [facility] Physical Therapy: [phone].   If you are unable to process this request in 24 hours please contact our office: [phone].    ___ I have read the above report and request that my patient continue as recommended.   ___ I have read the above report and request that my patient continue therapy with the following changes/special instructions:_________________________________________________________   ___ I have read the above report and request that my patient be discharged from therapy.      Physician Signature:        Date:       Time:

## 2019-03-13 NOTE — PROGRESS NOTES
PHYSICAL THERAPY - DAILY TREATMENT NOTE    Patient Name: Laurie Tobias        Date: 3/13/2019  : 1975   YES Patient  Verified  Visit #:   5     Insurance: Payor: Doron Mccormick / Plan: Ap Villasenor / Product Type: HMO /      In time: 8:00 Out time: 8:37   Total Treatment Time: 37     Medicare/BCBS [place] Time Tracking (below)   Total Timed Codes (min): 37 1:1 Treatment Time:  37     TREATMENT AREA =  Low back pain [M54.5]  Trochanteric bursitis, left hip [M70.62]  SUBJECTIVE    Pain Level (on 0 to 10 scale):  6-7  / 10   Medication Changes/New allergies or changes in medical history, any new surgeries or procedures? NO    If yes, update Summary List   Subjective Functional Status/Changes:  []  No changes reported   Pt reports increased pain since Friday, but unable to attribute it to any specific activity. Pt reports performing pilates last Wednesday with no issue during or after activity. Pt reports pain with 1 mile walk on .         OBJECTIVE    9 min Therapeutic Activity: FOTO   Rationale: assess ADL/IADL function, functional mobility and gait to improve the patient's ability to perform ADLs/IALDs, functional mobility and gait safely and independently without increased pain/symptoms      20 min Therapeutic Exercise:  [x]  See flow sheet   Rationale:      increase ROM, increase strength, improve coordination, improve balance and increase proprioception to improve the patients ability to perform ADLs/IADLs, functional mobility and gait safely and independently without increased pain/sypmtoms    8 min Manual Therapy: Pubic clearing technique; MET for R ant rotated innominate   Rationale:      decrease pain, increase ROM and increase tissue extensibility to improve patient's ability to tolerate standing/walking activities    During TE min Patient Education:  YES  Reviewed HEP   []  Progressed/Changed HEP based on:   Cont HEP     Other Objective/Functional Measures:  SI joint check: R ant rotated innominate    Cues for form with S/L hip ABD  Cues to stay in pain free range with prone hip ext    Bilateral hip flexion 4/5; all other lower extremity strength 5/5  Jr test (+) bilaterally  [employer] test (-) bilaterally    FOTO: 65/100 (lumbar spine - stage 4: pt exhibits little difficulty performing usual work or household activities and hobbites); 63/100 (hip - stage 4: pt exhibits little difficulty performing usual work or household activities and hobbites )   Post Treatment Pain Level (on 0 to 10) scale:   0  / 10     ASSESSMENT    Assessment/Changes in Function:   See progress note     [x]  See Progress Note/Recertification   Patient will continue to benefit from skilled PT services - see progress note   Progress toward goals / Updated goals:  See progress note     PLAN    [x]  Upgrade activities as tolerated YES Continue plan of care   []  Discharge due to :    []  Other:      Therapist: ALYSHA Ballesteros    Date: 3/13/2019 Time: 8:03 AM     Future Appointments   Date Time Provider Adolfo Silva   3/18/2019  8:30 AM Cindi Albright [address] [place] [address]   3/27/2019  8:00 AM Cindi Albright [address] [place] [address]   3/28/2019  4:00 PM Lisa Ovalles DO [place]

## 2019-03-18 ENCOUNTER — HOSPITAL ENCOUNTER (OUTPATIENT)
Dept: PHYSICAL THERAPY | Age: 44
Discharge: HOME | End: 2019-03-18
Payer: COMMERCIAL

## 2019-03-18 PROCEDURE — 97110 THERAPEUTIC EXERCISES: CPT

## 2019-03-18 NOTE — PROGRESS NOTES
PHYSICAL THERAPY - DAILY TREATMENT NOTE    Patient Name: Venita Cade        Date: 3/18/2019  : 1975   YES Patient  Verified  Visit #:   6   of   9  Insurance: Payor: Toma Ramirez / Plan: Zack Mchugh / Product Type: HMO /      In time: 8:28 Out time: 9:04   Total Treatment Time: 36     Medicare/BCBS [place] Time Tracking (below)   Total Timed Codes (min): 36 1:1 Treatment Time:  36     TREATMENT AREA =  Low back pain [M54.5]  Trochanteric bursitis, left hip [M70.62]  SUBJECTIVE    Pain Level (on 0 to 10 scale):  5  / 10   Medication Changes/New allergies or changes in medical history, any new surgeries or procedures? NO    If yes, update Summary List   Subjective Functional Status/Changes:  []  No changes reported   Pt reports usual pain, but feels better since last weeks exacerbation. Pt reports not doing pilates or going for a long walk since last visit. OBJECTIVE    36 min Therapeutic Exercise:  [x]  See flow sheet   Rationale:      increase ROM, increase strength, improve coordination, improve balance and increase proprioception to improve the patients ability to facilitate safe functional independence with ADLs/IADLs     During TE min Patient Education:  YES  Reviewed HEP   [x]  Progressed/Changed HEP based on:   Cont'd HEP with addition of standing hip flexor stretch to increase hip ROM and decrease low back pain     Other Objective/Functional Measures: Added 2#  weight with standing hip 3 way, SLR flexion and SLR abduction. Added standing marches  Added standing hip flexor stretch against wall   Post Treatment Pain Level (on 0 to 10) scale:   0  / 10     ASSESSMENT    Assessment/Changes in Function:   Pt tolerated exercises well with no adverse reactions. Pt required multiple cuing for standing hip flexor stretch technique to maintain proper foot and hip alignment. Pt reported feeling more of a stretch with this stretch compared to modified General Harvey.      []  See Progress Note/Recertification   Patient will continue to benefit from skilled PT services to modify and progress therapeutic interventions, address functional mobility deficits, address ROM deficits, address strength deficits, analyze and address soft tissue restrictions, analyze and cue movement patterns, analyze and modify body mechanics/ergonomics, assess and modify postural abnormalities and instruct in home and community integration to attain remaining goals. Progress toward goals / Updated goals:  1. Patient will demonstrate increased hip flexibility with negative Jr tests bilaterally to facilitate decreased/abolished low back pain. - Added standing hip flexor stretch. 2. Patient will demonstrate increased hip flexibility with negative piriformis tests bilaterally to facilitate increased mobility with ADLs/IADLs, and decrease/abolish low back pain. - Preceded piriformis stretch with hook lying hip adduction to promote reciprocal inhibition of piriformis prior to stretching. 3. Patient will report less than or equal to 4/10 pain at worst to allow increased activity tolerance. - Pt denies pain following treatment this session  4.  Patient will score greater than or equal to 69/100 (stage 4, little difficulty performing usual work or household activities) on [address] to indicate improved function     PLAN    [x]  Upgrade activities as tolerated YES Continue plan of care   []  Discharge due to :    [x]  Other: Attempt world's greatest stretch (WGS) 1 following NuStep/Bike next visit     Therapist: Licha Anne, SPT; Papa Glasgow PT    Date: 3/18/2019 Time: 8:27 AM     Future Appointments   Date Time Provider Adolfo Silva   3/18/2019  8:30 AM Kodi Stark PT [facility]   3/27/2019  8:00 AM Kodi Stark PT [facility]   3/28/2019  4:00 PM Magda Ovalles DO [place]

## 2019-03-27 ENCOUNTER — HOSPITAL ENCOUNTER (OUTPATIENT)
Dept: PHYSICAL THERAPY | Age: 44
Discharge: HOME | End: 2019-03-27
Payer: COMMERCIAL

## 2019-03-27 PROCEDURE — 97110 THERAPEUTIC EXERCISES: CPT

## 2019-03-27 PROCEDURE — 97530 THERAPEUTIC ACTIVITIES: CPT

## 2019-03-27 NOTE — PROGRESS NOTES
PHYSICAL THERAPY - DAILY TREATMENT NOTE Patient Name: Laurie Tobias        Date: 3/27/2019 : 1975   YES Patient  Verified Visit #:   7   of   9  Insurance: Payor: Doron Mccormick / Plan: Ap Villasenor / Product Type: HMO /   
 
In time: 8:00 Out time: 8:30 Total Treatment Time: 30 Medicare/BCBS [place] Time Tracking (below) Total Timed Codes (min):  30 1:1 Treatment Time:  30 TREATMENT AREA =  Low back pain [M54.5] Trochanteric bursitis, left hip [M70.62] SUBJECTIVE Pain Level (on 0 to 10 scale):  5   / 10 Medication Changes/New allergies or changes in medical history, any new surgeries or procedures? NO    If yes, update Summary List  
Subjective Functional Status/Changes:  []  No changes reported Pt reports 5/10 L hip pain, 4/10 LBP. Pt reports her back feels better since starting therapy, but her hip feels about the same constant pain. Pt reports her hip felt better initially, but then returned to her prior pain level independent of activity in therapy and at home. Pt reports attempting a 3 mile walk over the weekend, and was unable to continue USP through secondary to pain, but then performed stretches and was able to finish the walk. Pt reports she feels  ready to be discharged from PT and feels comfortable with being independent with her HEP. Pt reports she is scheduled to follow up with her physician tomorrow. OBJECTIVE 15 min Therapeutic Exercise:  [x]  See flow sheet (MMT/flexibility testing, HEP review) Rationale:      increase ROM and increase strength to improve the patients ability to perform ADLs/IADLs, functional mobility and gait safely and independently without increased pain 15 min Therapeutic Activity: FOTO Rationale: assess ADL/IADL function, functional mobility and gait to improve the patient's ability to perform ADLs/IADLs, functional mobility and gait safely and independently without increased pain During TE min Patient Education:  YES  Reviewed HEP []  Progressed/Changed HEP based on:   Reviewed strategies of activity modification and gradual reintegration into PLOF and progression of HEP based on symptoms. Other Objective/Functional Measures: 
 
Mildly tender palpable mass over L greater trochanter. Hip flexion strength 4/5 bilaterally, all other LE strength 5/5. Jr test (+) bilaterally Piriformis test (+) bilaterally FOTO (hip) = 58/100, FOTO (lumbar spine) = 61/100 See D/C note. Post Treatment Pain Level (on 0 to 10) scale:   5  / 10 ASSESSMENT Assessment/Changes in Function:  
 
See D/C note. []  See Progress Note/Recertification Patient will continue to progress her HEP to manage her symptoms as needed. Progress toward goals / Updated goals: 
See D/C note. PLAN 
 
[]  Upgrade activities as tolerated NO Continue plan of care [x]  Discharge due to : I with HEP; plateau in progress  
[]  Other:   
 
Therapist: Landon Moore, SPT; Sim Mejia, PT Date: 3/27/2019 Time: 8:01 AM  
 
Future Appointments Date Time Provider Adolfo Silva 3/28/2019  4:00 PM Kaye Ovalles DO [place]

## 2019-03-27 NOTE — PROGRESS NOTES
Shaun Butt [address]  [facility] PHYSICAL THERAPY 
[address], [place], [address] - Phone: [phone]  Fax: [phone] DISCHARGE SUMMARY Patient Name: Sanchez Fuchs : 1975 Treatment/Medical Diagnosis: Low back pain [M54.5] Trochanteric bursitis, left hip [M70.62] Referral Source: Jeana Edge DO Date of Initial Visit: 2019 Attended Visits: 7 Missed Visits: 0  
SUMMARY OF TREATMENT Patient has been an active participant in a skilled outpatient physical therapy program consisting of therapeutic exercise, manual therapy, patient education, and a personalized home exercise program, to decrease/abolish pain and symptoms and increase strength and range of motion, to facilitate safety with gait and functional independence with ADLs/IADLs and a return to recreational activities at her OF. CURRENT STATUS Patient reports her back feels better since starting therapy, but her hip feels about the same constant pain. Patient reports her hip felt better initially, but then returned to her prior pain level independent of activity in therapy and at home. FOTO: 61/100 (lumbar spine - stage 4: pt exhibits little difficulty performing usual work or household activities and hobbites); Patient demonstrates a decrease from 65/100 since last progress note with:  
-Moderate activities like moving a table, pushing a vacuum , bowling, or playing golf (Increased difficulty from not limited at all to limited a little) 
-Climbing several flights of stairs (Increased difficulty from not limited at all to imited a little) FOTO: 58/100 (hip - stage 4: pt exhibits little difficulty performing usual work or household activities and hobbies) Patient demonstrates a decrease from 63/10 since last progress note with: 
-Running on uneven ground (Increased from a little bit of difficulty to moderate difficulty) -Making sharp turns while running fast (Increased from a little bit of difficulty to moderate difficulty) -Running on even ground (Increased from a little bit of difficulty to moderate difficulty) -Hopping (Increased from a little bit of difficulty to moderate difficulty) 
  Mildly tender palpable mass over L greater trochanter. Hip flexion strength 4/5 bilaterally, all other LE strength 5/5. Jr test (+) bilaterally for rectus femoris Piriformis test (+) bilaterally, however, less tight compared to last progress note Goal/Measure of Progress Goal Met? 1. Patient will demonstrate increased hip flexibility with negative Jr tests bilaterally to facilitate decreased/abolished low back pain. Status at last Eval: (+) Jr tests bilaterally Current Status: (+) Jr tests bilaterally for rectus femoris progressing 2. Patient will demonstrate increased hip flexibility with negative piriformis tests bilaterally to facilitate increased mobility with ADLs/IADLs, and decrease/abolish low back pain Status at last Eval: (+) Piriformis test bilaterally Current Status: (+) Piriformis test bilaterally progressing 3. Patient will report less than or equal to 4/10 pain at worst to allow increased activity tolerance. Status at last Eval: 7/10 at worst Current Status: 5/10 at worst progressing 4. Patient will score greater than or equal to 69/100 (stage 4, little difficulty performing usual work or household activities) on FOTO to indicate improved function. Status at last Eval: FOTO score: 
Lumbar - 65/100 Hip - 63/100 Current Status: FOTO score: 
Lumbar - 61/100 Hip - 58/100 no RECOMMENDATIONS Discontinue therapy due to patient confidence with independently managing her symptoms with home exercise program after achieving short term goals, but reached plateau with progress towards the above goals. If you have any questions/comments please contact us directly at [phone]. Thank you for allowing us to assist in the care of your patient. Therapist Signature: Douglas Rodriguez SPT; Alexa Brown, PT Date: 3/27/2019 Time: 11:11 AM  
 
NOTE TO PHYSICIAN:  PLEASE COMPLETE THE ORDERS BELOW AND FAX TO [facility] [place] Physical Therapy: [phone]. If you are unable to process this request in 24 hours please contact our office: [phone]. 
 
___ I have read the above report and request that my patient be discharged from therapy.   
 
Physician Signature:       Date:      Time:

## 2019-03-27 NOTE — PROGRESS NOTES
Shaun Butt [address]  [facility] PHYSICAL THERAPY 
[address] [facility] [address], [place], [address] - Phone: [phone]  Fax: [phone] DISCHARGE SUMMARY Patient Name: Edy Morse : 1975 Treatment/Medical Diagnosis: Low back pain [M54.5] Trochanteric bursitis, left hip [M70.62] Referral Source: Ger Masterson DO Date of Initial Visit: *** Attended Visits: *** Missed Visits: *** SUMMARY OF TREATMENT 
*** CURRENT STATUS 
*** 
 
Goal/Measure of Progress Goal Met? 1.  *** Status at last Eval: *** Current Status: *** {Yes/No-Ex:690376} 2. *** Status at last Eval: *** Current Status: *** {Yes/No-Ex:801885} 3. *** Status at last Eval: *** Current Status: *** {Yes/No-Ex:003524} 4. *** Status at last Eval: *** Current Status: *** {Yes/No-Ex:141684} G-Codes: *** RECOMMENDATIONS 
{[facility] D/C RECOMMENDATIONS:84887} If you have any questions/comments please contact us directly at [phone]. Thank you for allowing us to assist in the care of your patient. Therapist Signature: Melody St Date: *** Time: 8:00 AM  
 
NOTE TO PHYSICIAN:  PLEASE COMPLETE THE ORDERS BELOW AND FAX TO [facility] [place] Physical Therapy: [phone]. If you are unable to process this request in 24 hours please contact our office: [phone]. 
 
___ I have read the above report and request that my patient be discharged from therapy.   
 
Physician Signature:       Date:      Time:

## 2019-03-28 ENCOUNTER — OFFICE VISIT (OUTPATIENT)
Dept: FAMILY MEDICINE CLINIC | Age: 44
End: 2019-03-28

## 2019-03-28 VITALS
HEART RATE: 101 BPM | TEMPERATURE: 99.1 F | RESPIRATION RATE: 14 BRPM | HEIGHT: 67 IN | BODY MASS INDEX: 22.22 KG/M2 | DIASTOLIC BLOOD PRESSURE: 86 MMHG | OXYGEN SATURATION: 98 % | SYSTOLIC BLOOD PRESSURE: 124 MMHG | WEIGHT: 141.6 LBS

## 2019-03-28 DIAGNOSIS — M25.552 LEFT HIP PAIN: ICD-10-CM

## 2019-03-28 DIAGNOSIS — Z12.4 SCREENING FOR CERVICAL CANCER: ICD-10-CM

## 2019-03-28 DIAGNOSIS — Z12.39 SCREENING FOR BREAST CANCER: ICD-10-CM

## 2019-03-28 DIAGNOSIS — Z01.419 WELL WOMAN EXAM WITH ROUTINE GYNECOLOGICAL EXAM: Primary | ICD-10-CM

## 2019-03-28 NOTE — PROGRESS NOTES
Subjective: HPI: 
Nohemy Yeboah is a 44 y.o. female who presents to the office today for routine care and annual wellness exam. 
 
Left hip pain: Pt complains of continued hip pain with a knot on her hip. Pt reports that physical therapy provided no relief of hip pain. Lower back pain: Pt complains of chronic back pain that started 10 years ago. She reports that physical therapy provided some relief of back pain. She has had her last session of physical therapy. Labs reviewed. Normal. 
 
Current Outpatient Medications Medication Sig Dispense Refill  Cetirizine (ZYRTEC) 10 mg cap Take  by mouth.  fluticasone propionate (FLONASE ALLERGY RELIEF NA) by Nasal route.  predniSONE (DELTASONE) 20 mg tablet 3 tabs daily for 3 days, 2 tabs daily for 3 days, 1 tab daily for 3 days, 1/2 tab daily for 4 days. 21 Tab 0 No Known Allergies No past medical history on file. No past surgical history on file. No family history on file. Social History Socioeconomic History  Marital status:  Spouse name: Not on file  Number of children: Not on file  Years of education: Not on file  Highest education level: Not on file Occupational History  Not on file Social Needs  Financial resource strain: Not on file  Food insecurity:  
  Worry: Not on file Inability: Not on file  Transportation needs:  
  Medical: Not on file Non-medical: Not on file Tobacco Use  Smoking status: Former Smoker Packs/day: 0.50 Years: 25.00 Pack years: 12.50 Types: Cigarettes  Smokeless tobacco: Never Used  Tobacco comment: patient vapes Substance and Sexual Activity  Alcohol use: Yes Frequency: 2-4 times a month Drinks per session: 3 or 4 Binge frequency: Never  Drug use: Yes Frequency: 7.0 times per week Types: Marijuana  Sexual activity: Yes  
  Partners: Male Birth control/protection: None Lifestyle  Physical activity:  
  Days per week: Not on file Minutes per session: Not on file  Stress: Not on file Relationships  Social connections:  
  Talks on phone: Not on file Gets together: Not on file Attends Scientologist service: Not on file Active member of club or organization: Not on file Attends meetings of clubs or organizations: Not on file Relationship status: Not on file  Intimate partner violence:  
  Fear of current or ex partner: Not on file Emotionally abused: Not on file Physically abused: Not on file Forced sexual activity: Not on file Other Topics Concern  Not on file Social History Narrative  Not on file REVIEW OF SYSTEM: 
ROS Objective:  
 
Visit Vitals /86 (BP 1 Location: Right arm, BP Patient Position: Sitting) Pulse (!) 101 Temp 99.1 °F (37.3 °C) (Oral) Resp 14 Ht 5' 6.5\" (1.689 m) Wt 141 lb 9.6 oz (64.2 kg) LMP 03/19/2019 (Exact Date) SpO2 98% BMI 22.51 kg/m² PHYSICAL EXAM: 
Physical Exam 
 
Lab Results Component Value Date/Time Sodium 143 02/05/2019 10:43 AM  
 Potassium 4.7 02/05/2019 10:43 AM  
 Chloride 105 02/05/2019 10:43 AM  
 CO2 21 02/05/2019 10:43 AM  
 Glucose 91 02/05/2019 10:43 AM  
 BUN 9 02/05/2019 10:43 AM  
 Creatinine 0.60 02/05/2019 10:43 AM  
 BUN/Creatinine ratio 15 02/05/2019 10:43 AM  
 GFR est  02/05/2019 10:43 AM  
 GFR est non- 02/05/2019 10:43 AM  
 Calcium 9.2 02/05/2019 10:43 AM  
 Bilirubin, total 0.5 02/05/2019 10:43 AM  
 AST (SGOT) 14 02/05/2019 10:43 AM  
 Alk. phosphatase 47 02/05/2019 10:43 AM  
 Protein, total 6.8 02/05/2019 10:43 AM  
 Albumin 4.6 02/05/2019 10:43 AM  
 A-G Ratio 2.1 02/05/2019 10:43 AM  
 ALT (SGPT) 11 02/05/2019 10:43 AM  
 
Lab Results Component Value Date/Time  Cholesterol, total 168 02/05/2019 10:43 AM  
 HDL Cholesterol 61 02/05/2019 10:43 AM  
 LDL, calculated 93 02/05/2019 10:43 AM  
 VLDL, calculated 14 02/05/2019 10:43 AM  
 Triglyceride 69 02/05/2019 10:43 AM  
 
 
Assessment/Plan: ICD-10-CM ICD-9-CM 1. Well woman exam with routine gynecological exam Z01.419 V72.31 [facility] MAMMO BI SCREENING INCL CAD  
   PAP IG, APTIMA HPV AND RFX 16/18,45 ([id number]) 2. Screening for cervical cancer Z12.4 V76.2 PAP IG, APTIMA HPV AND RFX 16/18,45 ([id number]) 3. Screening for breast cancer Z12.31 V76.10 [facility] MAMMO BI SCREENING INCL CAD 4. Left hip pain M25.552 719.45 REFERRAL TO SPORTS MEDICINE Patient given opportunity to ask questions. Questions answered. Patient understands plan of care. Written by Martin Peterson, as dictated by DO. JESSI Gimenez, Dr. Abbi Quinn, confirm that all documentation is accurate.

## 2019-03-28 NOTE — PATIENT INSTRUCTIONS
Well Visit, Ages 25 to 48: Care Instructions Your Care Instructions Physical exams can help you stay healthy. Your doctor has checked your overall health and may have suggested ways to take good care of yourself. He or she also may have recommended tests. At home, you can help prevent illness with healthy eating, regular exercise, and other steps. Follow-up care is a key part of your treatment and safety. Be sure to make and go to all appointments, and call your doctor if you are having problems. It's also a good idea to know your test results and keep a list of the medicines you take. How can you care for yourself at home? · Reach and stay at a healthy weight. This will lower your risk for many problems, such as obesity, diabetes, heart disease, and high blood pressure. · Get at least 30 minutes of physical activity on most days of the week. Walking is a good choice. You also may want to do other activities, such as running, swimming, cycling, or playing tennis or team sports. Discuss any changes in your exercise program with your doctor. · Do not smoke or allow others to smoke around you. If you need help quitting, talk to your doctor about stop-smoking programs and medicines. These can increase your chances of quitting for good. · Talk to your doctor about whether you have any risk factors for sexually transmitted infections (STIs). Having one sex partner (who does not have STIs and does not have sex with anyone else) is a good way to avoid these infections. · Use birth control if you do not want to have children at this time. Talk with your doctor about the choices available and what might be best for you. · Protect your skin from too much sun. When you're outdoors from 10 a.m. to 4 p.m., stay in the shade or cover up with clothing and a hat with a wide brim. Wear sunglasses that block UV rays. Even when it's cloudy, put broad-spectrum sunscreen (SPF 30 or higher) on any exposed skin. · See a dentist one or two times a year for checkups and to have your teeth cleaned. · Wear a seat belt in the car. · Drink alcohol in moderation, if at all. That means no more than 2 drinks a day for men and 1 drink a day for women. Follow your doctor's advice about when to have certain tests. These tests can spot problems early. For everyone · Cholesterol. Have the fat (cholesterol) in your blood tested after age 21. Your doctor will tell you how often to have this done based on your age, family history, or other things that can increase your risk for heart disease. · Blood pressure. Have your blood pressure checked during a routine doctor visit. Your doctor will tell you how often to check your blood pressure based on your age, your blood pressure results, and other factors. · Vision. Talk with your doctor about how often to have a glaucoma test. 
· Diabetes. Ask your doctor whether you should have tests for diabetes. · Colon cancer. Have a test for colon cancer at age 48. You may have one of several tests. If you are younger than 48, you may need a test earlier if you have any risk factors. Risk factors include whether you already had a precancerous polyp removed from your colon or whether your parent, brother, sister, or child has had colon cancer. For women · Breast exam and mammogram. Talk to your doctor about when you should have a clinical breast exam and a mammogram. Medical experts differ on whether and how often women under 50 should have these tests. Your doctor can help you decide what is right for you. · Pap test and pelvic exam. Begin Pap tests at age 24. A Pap test is the best way to find cervical cancer. The test often is part of a pelvic exam. Ask how often to have this test. 
· Tests for sexually transmitted infections (STIs). Ask whether you should have tests for STIs. You may be at risk if you have sex with more than one person, especially if your partners do not wear condoms. When should you call for help? Watch closely for changes in your health, and be sure to contact your doctor if you have any problems or symptoms that concern you. Where can you learn more? Go to http://yelitza-blaise.info/. Enter P072 in the search box to learn more about \"Well Visit, Ages 25 to 48: Care Instructions. \" Current as of: March 28, 2018 Content Version: 11.9 © 8428-1780 [employer]. Care instructions adapted under license by [employer] (which disclaims liability or warranty for this information). If you have questions about a medical condition or this instruction, always ask your healthcare professional. [address] any warranty or liability for your use of this information.

## 2019-03-28 NOTE — PROGRESS NOTES
Subjective:  
44 y.o. female for annual routine Pap and checkup. Patient's last menstrual period was 03/19/2019 (exact date). LMP Last PAP: 9 years ago History of abnormal PAP: none Age at menarche: 16 
#partners in last year: 1 Concerns for STD?: no 
Abnormal vaginal discharge: none Family history for GYN cancer : mother had benign tumors in her vagina Family history breast cancer: maternal aunt (dx age 48), maternal great grandmother Mammogram: never Family history for colon cancer: none Current Outpatient Medications Medication Sig Dispense Refill  Cetirizine (ZYRTEC) 10 mg cap Take  by mouth.  fluticasone propionate (FLONASE ALLERGY RELIEF NA) by Nasal route.  predniSONE (DELTASONE) 20 mg tablet 3 tabs daily for 3 days, 2 tabs daily for 3 days, 1 tab daily for 3 days, 1/2 tab daily for 4 days. 21 Tab 0 No Known Allergies No past medical history on file. No past surgical history on file. No family history on file. Social History Substance and Sexual Activity Sexual Activity Yes  Partners: Male  Birth control/protection: None Review of Systems Constitutional: Negative for chills and fever. Eyes: Negative for blurred vision. Respiratory: Negative for shortness of breath. Cardiovascular: Negative for chest pain, palpitations and leg swelling. Gastrointestinal: Negative for constipation, diarrhea, nausea and vomiting. Musculoskeletal: Positive for back pain and joint pain (left hip). Neurological: Negative for headaches. Objective:  
 
Visit Vitals /86 (BP 1 Location: Right arm, BP Patient Position: Sitting) Pulse (!) 101 Temp 99.1 °F (37.3 °C) (Oral) Resp 14 Ht 5' 6.5\" (1.689 m) Wt 141 lb 9.6 oz (64.2 kg) LMP 03/19/2019 (Exact Date) SpO2 98% BMI 22.51 kg/m² Physical Exam  
Constitutional: She is oriented to person, place, and time and well-developed, well-nourished, and in no distress.   
HENT:  
 Right Ear: Tympanic membrane, external ear and ear canal normal.  
Left Ear: Tympanic membrane, external ear and ear canal normal.  
Nose: Nose normal.  
Mouth/Throat: Oropharynx is clear and moist.  
Eyes: Pupils are equal, round, and reactive to light. Neck: Normal range of motion. Neck supple. No thyromegaly present. Cardiovascular: Normal rate, regular rhythm, normal heart sounds and intact distal pulses. No murmur heard. Pulmonary/Chest: Effort normal and breath sounds normal. She has no wheezes. Neurological: She is alert and oriented to person, place, and time. Skin: Skin is warm and dry. Vitals reviewed. BREAST EXAM:  right breast normal without mass, skin or nipple changes or axillary nodes, left breast normal without mass, skin or nipple changes or axillary nodes, bilateral implants, no palpable abnormalities otherwise. PELVIC EXAM: VULVA: normal appearing vulva with no masses, tenderness or lesions, VAGINA: normal appearing vagina with normal color and discharge, no lesions, CERVIX: normal appearing cervix without discharge or lesions, nulliparous os, UTERUS: uterus is normal size, shape, consistency and nontender, ADNEXA: normal adnexa in size, nontender and no masses Labs reviewed: 
Lab Results Component Value Date/Time Sodium 143 02/05/2019 10:43 AM  
 Potassium 4.7 02/05/2019 10:43 AM  
 Chloride 105 02/05/2019 10:43 AM  
 CO2 21 02/05/2019 10:43 AM  
 Glucose 91 02/05/2019 10:43 AM  
 BUN 9 02/05/2019 10:43 AM  
 Creatinine 0.60 02/05/2019 10:43 AM  
 BUN/Creatinine ratio 15 02/05/2019 10:43 AM  
 GFR est  02/05/2019 10:43 AM  
 GFR est non- 02/05/2019 10:43 AM  
 Calcium 9.2 02/05/2019 10:43 AM  
 Bilirubin, total 0.5 02/05/2019 10:43 AM  
 AST (SGOT) 14 02/05/2019 10:43 AM  
 Alk.  phosphatase 47 02/05/2019 10:43 AM  
 Protein, total 6.8 02/05/2019 10:43 AM  
 Albumin 4.6 02/05/2019 10:43 AM  
 A-G Ratio 2.1 02/05/2019 10:43 AM  
 ALT (SGPT) 11 02/05/2019 10:43 AM  
 
Lab Results Component Value Date/Time Cholesterol, total 168 02/05/2019 10:43 AM  
 HDL Cholesterol 61 02/05/2019 10:43 AM  
 LDL, calculated 93 02/05/2019 10:43 AM  
 VLDL, calculated 14 02/05/2019 10:43 AM  
 Triglyceride 69 02/05/2019 10:43 AM  
 
 
 
Assessment/Plan:  
well woman 
mammogram 
pap smear 
return annually or prn 
  ICD-10-CM ICD-9-CM 1. Well woman exam with routine gynecological exam Z01.419 V72.31 [facility] MAMMO BI SCREENING INCL CAD  
   PAP IG, APTIMA HPV AND RFX 16/18,45 ([id number]) 2. Screening for cervical cancer Z12.4 V76.2 PAP IG, APTIMA HPV AND RFX 16/18,45 ([id number]) 3. Screening for breast cancer Z12.31 V76.10 [facility] MAMMO BI SCREENING INCL CAD 4. Left hip pain M25.552 719.45 REFERRAL TO SPORTS MEDICINE Bing Cook

## 2019-03-28 NOTE — PROGRESS NOTES
Vannessa Marley is a 44 y.o. female presented to clinic for a routine well woman exam. Pt c/o 6/10 left hip pain relieved with stretching. Worse while sleeping. 1. Have you been to the ER, urgent care clinic since your last visit? Hospitalized since your last visit? No 
 
2. Have you seen or consulted any other health care providers outside of the [address] [place] [address] since your last visit? Include any pap smears or colon screening. No  
 
Learning Assessment 2/5/2019 PRIMARY LEARNER Patient PRIMARY LANGUAGE ENGLISH  
LEARNER PREFERENCE PRIMARY DEMONSTRATION  
ANSWERED BY patient RELATIONSHIP SELF Health Maintenance Due Topic Date Due  
 DTaP/Tdap/Td series (1 - Tdap) 10/29/1997  PAP AKA CERVICAL CYTOLOGY  10/29/1997  Influenza Age 5 to Adult  08/01/2018

## 2019-04-03 LAB
CYTOLOGIST CVX/VAG CYTO: NORMAL
CYTOLOGY CVX/VAG DOC THIN PREP: NORMAL
DX ICD CODE: NORMAL
HPV I/H RISK 4 DNA CVX QL PROBE+SIG AMP: NEGATIVE
Lab: NORMAL
OTHER STN SPEC: NORMAL
PATH REPORT.FINAL DX SPEC: NORMAL
STAT OF ADQ CVX/VAG CYTO-IMP: NORMAL

## 2019-05-13 ENCOUNTER — OFFICE VISIT (OUTPATIENT)
Dept: ORTHOPEDIC SURGERY | Age: 44
End: 2019-05-13

## 2019-05-13 VITALS
HEIGHT: 67 IN | DIASTOLIC BLOOD PRESSURE: 73 MMHG | SYSTOLIC BLOOD PRESSURE: 105 MMHG | RESPIRATION RATE: 15 BRPM | WEIGHT: 142.6 LBS | TEMPERATURE: 98 F | HEART RATE: 71 BPM | BODY MASS INDEX: 22.38 KG/M2

## 2019-05-13 DIAGNOSIS — M76.32 ILIOTIBIAL BAND SYNDROME, LEFT: Primary | ICD-10-CM

## 2019-05-13 RX ORDER — MELOXICAM 15 MG/1
TABLET ORAL
Qty: 90 TAB | Refills: 0 | Status: SHIPPED | OUTPATIENT
Start: 2019-05-13

## 2019-05-13 NOTE — PROGRESS NOTES
AVS reviewed: YES  HEP: AT demonstrated  Resources Provided: YES Both Videos & Photos  Patient questions/concerns answered: NO. Pt denied questions/concerns  Patient verbalized understanding of treatment plan: YES

## 2019-05-13 NOTE — Clinical Note
Thank you very much for sending me this patient. Please see my note for details. I plan to follow until improved/resolved.

## 2019-05-13 NOTE — PROGRESS NOTES
HISTORY OF PRESENT ILLNESS    Carmen Mcelroy is a 44y.o. year old female comes in today as new patient to be evaluated and treated at the request of Breonica Dee DO for my opinion/advice regarding: left hip pain    Patients symptoms have been present for several months. Pain level 5/10 lateral hip, It has improved with PT and stretch. Patient has tried:  PT for 8 weeks finished [id number]. It is described as low back pain for about 10 years and early this year pain lateral hip. Rides bike, walk, and pilates about 3/week or so. Did have prednisone prior. IMAGING: XR left hip today no abnormality per my review    History reviewed. No pertinent surgical history. Social History     Socioeconomic History    Marital status:      Spouse name: Not on file    Number of children: Not on file    Years of education: Not on file    Highest education level: Not on file   Tobacco Use    Smoking status: Former Smoker     Packs/day: 0.50     Years: 25.00     Pack years: 12.50     Types: Cigarettes    Smokeless tobacco: Never Used    Tobacco comment: patient vapes   Substance and Sexual Activity    Alcohol use: Yes     Frequency: 2-4 times a month     Drinks per session: 3 or 4     Binge frequency: Never    Drug use: Yes     Frequency: 7.0 times per week     Types: Marijuana    Sexual activity: Yes     Partners: Male     Birth control/protection: None     Current Outpatient Medications   Medication Sig Dispense Refill    Cetirizine (ZYRTEC) 10 mg cap Take  by mouth.  fluticasone propionate (FLONASE ALLERGY RELIEF NA) by Nasal route.  predniSONE (DELTASONE) 20 mg tablet 3 tabs daily for 3 days, 2 tabs daily for 3 days, 1 tab daily for 3 days, 1/2 tab daily for 4 days. 21 Tab 0     History reviewed. No pertinent past medical history. History reviewed. No pertinent family history. ROS:  No numb, tingle, incont, fever.   All other systems reviewed and negative aside from that written in the HPI.      Objective:  /73   Pulse 71   Temp 98 °F (36.7 °C)   Resp 15   Ht 5' 6.5\" (1.689 m)   Wt 142 lb 9.6 oz (64.7 kg)   BMI 22.67 kg/m²   GEN:  Appears stated age in NAD. HEAD:  Normocephalic, Atraumatic. NEURO:  Sensation intact to light touch. Reflexes +2/4 patellar and Achilles bilaterally. M/S: Examined standing and supine. Slump negative. LE Strength +5/5 bilaterally DENISHA negative bilateral .  Internal rotation normal. bilaterally  minimal TTP over greater trochanter. Piriformis normal bilateral   Sadie's test negative bilateral  Jr test negative for hip flexor and quad bilateral   EXT: no clubbing/cyanosis. no edema. SKIN: Warm/dry without rash. HEENT: Conjunctiva/lids WNL. External canals/nares WNL. Tongue midline. PERRL, EOMI. Hearing intact. NECK: Trachea midline. Supple, Full ROM. No thyromegaly. CARDIAC: No edema. LUNGS: Normal effort. ABD: Soft, NT. No HSM. PSYCH: A+O x3. Appropriate judgment and insight. Assessment/Plan:     ICD-10-CM ICD-9-CM    1. Iliotibial band syndrome, left M76.32 728.89 XR HIP LT W OR WO PELV 2-3 VWS      meloxicam (MOBIC) 15 mg tablet       Patient (or guardian if minor) verbalizes understanding of evaluation and plan. Discussed IT band cause of Sx so will demo stretch and Rx mobic for PRN and RTC 4 weeks.   Will also demo back strengthening/stretch program.

## 2021-06-14 ENCOUNTER — TRANSCRIBE ORDER (OUTPATIENT)
Dept: SCHEDULING | Age: 46
End: 2021-06-14

## 2021-06-14 DIAGNOSIS — Z12.31 VISIT FOR SCREENING MAMMOGRAM: Primary | ICD-10-CM

## 2021-06-21 ENCOUNTER — HOSPITAL ENCOUNTER (OUTPATIENT)
Dept: WOMENS IMAGING | Age: 46
Discharge: HOME | End: 2021-06-21
Attending: FAMILY MEDICINE
Payer: COMMERCIAL

## 2021-06-21 DIAGNOSIS — Z12.31 VISIT FOR SCREENING MAMMOGRAM: ICD-10-CM

## 2021-06-21 PROCEDURE — 77063 BREAST TOMOSYNTHESIS BI: CPT

## 2021-06-24 ENCOUNTER — HOSPITAL ENCOUNTER (OUTPATIENT)
Dept: WOMENS IMAGING | Age: 46
Discharge: HOME | End: 2021-06-24
Attending: FAMILY MEDICINE
Payer: COMMERCIAL

## 2021-06-24 ENCOUNTER — HOSPITAL ENCOUNTER (OUTPATIENT)
Dept: ULTRASOUND IMAGING | Age: 46
Discharge: HOME | End: 2021-06-24
Attending: FAMILY MEDICINE
Payer: COMMERCIAL

## 2021-06-24 DIAGNOSIS — R92.8 ABNORMAL FINDING ON BREAST IMAGING: ICD-10-CM

## 2021-06-24 PROCEDURE — 77061 BREAST TOMOSYNTHESIS UNI: CPT

## 2021-06-24 PROCEDURE — 76642 ULTRASOUND BREAST LIMITED: CPT

## 2022-03-31 ENCOUNTER — TELEPHONE (OUTPATIENT)
Dept: ORTHOPEDIC SURGERY | Age: 47
End: 2022-03-31

## 2022-04-01 ENCOUNTER — OFFICE VISIT (OUTPATIENT)
Dept: ORTHOPEDIC SURGERY | Age: 47
End: 2022-04-01
Payer: COMMERCIAL

## 2022-04-01 VITALS — BODY MASS INDEX: 22.82 KG/M2 | WEIGHT: 142 LBS | HEIGHT: 66 IN | OXYGEN SATURATION: 100 % | HEART RATE: 85 BPM

## 2022-04-01 DIAGNOSIS — M25.532 BILATERAL WRIST PAIN: Primary | ICD-10-CM

## 2022-04-01 DIAGNOSIS — G56.01 RIGHT CARPAL TUNNEL SYNDROME: ICD-10-CM

## 2022-04-01 DIAGNOSIS — M25.531 BILATERAL WRIST PAIN: Primary | ICD-10-CM

## 2022-04-01 PROCEDURE — 99214 OFFICE O/P EST MOD 30 MIN: CPT | Performed by: ORTHOPAEDIC SURGERY

## 2022-04-01 NOTE — PROGRESS NOTES
Ferne Bamberger is a 40 y.o. female right handed unspecified employment. Worker's Compensation and legal considerations: none filed. Vitals:    04/01/22 0807   Pulse: 85   SpO2: 100%   Weight: 142 lb (64.4 kg)   Height: 5' 6\" (1.676 m)   PainSc:   5   PainLoc: Wrist           Chief Complaint   Patient presents with    Wrist Pain     Bilat    Hip Pain     Bilat         HPI: Patient presents today with complaints of bilateral wrist pain right worse than left. She also reports numbness and tingling in the right hand. She also has complaints of left hip pain for which she thought she was being seen. She says that when she called that was her main complaint. She denies any injuries to her right wrist.    Date of onset:  indeterminate    Injury: No    Prior Treatment:  No    Numbness/ Tingling: Yes: Comment: Right hand      ROS: Review of Systems - General ROS: negative  Psychological ROS: negative  ENT ROS: negative  Allergy and Immunology ROS: negative  Hematological and Lymphatic ROS: negative  Respiratory ROS: no cough, shortness of breath, or wheezing  Cardiovascular ROS: no chest pain or dyspnea on exertion  Gastrointestinal ROS: no abdominal pain, change in bowel habits, or black or bloody stools  Musculoskeletal ROS: negative  Neurological ROS: positive for - numbness/tingling  Dermatological ROS: negative    No past medical history on file. Past Surgical History:   Procedure Laterality Date    IMPLANT BREAST SILICONE/EQ         Current Outpatient Medications   Medication Sig Dispense Refill    meloxicam (MOBIC) 15 mg tablet Take 1 tab daily as needed pain with food. 90 Tab 0    Cetirizine (ZYRTEC) 10 mg cap Take  by mouth.  fluticasone propionate (FLONASE ALLERGY RELIEF NA) by Nasal route.  predniSONE (DELTASONE) 20 mg tablet 3 tabs daily for 3 days, 2 tabs daily for 3 days, 1 tab daily for 3 days, 1/2 tab daily for 4 days.  (Patient not taking: Reported on 4/1/2022) 21 Tab 0 No Known Allergies        PE:     Physical Exam  Vitals and nursing note reviewed. Constitutional:       General: She is not in acute distress. Appearance: Normal appearance. She is not ill-appearing. Cardiovascular:      Pulses: Normal pulses. Pulmonary:      Effort: Pulmonary effort is normal. No respiratory distress. Musculoskeletal:         General: Tenderness present. No swelling, deformity or signs of injury. Normal range of motion. Cervical back: Normal range of motion and neck supple. Right lower leg: No edema. Left lower leg: No edema. Skin:     General: Skin is warm and dry. Capillary Refill: Capillary refill takes less than 2 seconds. Findings: No bruising or erythema. Neurological:      General: No focal deficit present. Mental Status: She is alert and oriented to person, place, and time. Psychiatric:         Mood and Affect: Mood normal.         Behavior: Behavior normal.            Wrist: Tenderness palpation over the dorsal wrist that is exacerbated with extension on the right. Tenderness L R Test L R   1st Ext Comp - - Finkelstein's - -   Snuff Box - - Ferreira - -   2nd Ext Comp - - S-L Shear - -   S-L Joint - - L-T Shear - -   L-T Joint - - DRUJ Sup - -   6th Ext Comp - - DRUJ Pro - -   Ulnar Snuff - - DRUJ Grind - -   Fovea - - TFCC - -   STT Joint - - Mid-Carp Inst - -   FCR - - P-T Grind - -   Intersection - - ECU Sublux. - -      Dorsal Ganglion: -   Volar Ganglion: -      ROM: Full    NEUROVASCULAR    Examination L R Examination L R   Carpal Comp. - + Pronator Comp. - -   Carpal Tinel - + Pronator Tinel - -   Phalen's - - Pronator Stress - -   Cubital Comp. - - Guyon Comp. - -   Cubital Tinel - - Guyon Tinel - -   Elbow Hyperflexion - - Adson's - -   Spurling's - - SC Comp. - -   PCB Median abn - - SC Tinel - -   Radial Tinel - - IC Comp.  - -   Digital Tinel - - IC Tinel - -   Radial 2-Pt WNL WNL Ulnar 2-Pt WNL WNL     Radial Pulse: 2+  Capillary Refill: < 2 sec  Jonathan: Not Performed  Digital Jonathan: Not Performed          Imagin/1/2022 3 views WET READ of bilateral wrists is negative for any fracture dislocation or any other acute osseous abnormalities or degenerative changes. ICD-10-CM ICD-9-CM    1. Bilateral wrist pain  M25.531 719.43 XR WRIST RT AP/LAT/OBL MIN 3V    M25.532  XR WRIST LT AP/LAT/OBL MIN 3V   2. Right carpal tunnel syndrome  G56.01 354.0 EMG ONE EXTREMITY UPPER RT      NCV/LAT MOTOR PER NERVE UP/RT         Plan:     Right upper extremity EMG. Continue brace wear as tolerated. We offered the patient an appointment for her hip at one of our other locations we do not have a hip specialist that comes to this location. She declined at this time. Follow-up and Dispositions    · Return for EMG Review.           Plan was reviewed with patient, who verbalized agreement and understanding of the plan

## 2023-05-22 ENCOUNTER — HOSPITAL ENCOUNTER (OUTPATIENT)
Dept: WOMENS IMAGING | Facility: HOSPITAL | Age: 48
Discharge: HOME | End: 2023-05-25
Payer: COMMERCIAL

## 2023-05-22 DIAGNOSIS — Z12.31 VISIT FOR SCREENING MAMMOGRAM: ICD-10-CM

## 2023-05-22 PROCEDURE — 77063 BREAST TOMOSYNTHESIS BI: CPT

## 2023-07-19 ENCOUNTER — OFFICE VISIT (OUTPATIENT)
Facility: CLINIC | Age: 48
End: 2023-07-19
Payer: COMMERCIAL

## 2023-07-19 VITALS
HEIGHT: 67 IN | SYSTOLIC BLOOD PRESSURE: 111 MMHG | TEMPERATURE: 97.9 F | WEIGHT: 143 LBS | BODY MASS INDEX: 22.44 KG/M2 | OXYGEN SATURATION: 98 % | DIASTOLIC BLOOD PRESSURE: 77 MMHG | RESPIRATION RATE: 16 BRPM | HEART RATE: 87 BPM

## 2023-07-19 DIAGNOSIS — M25.50 MULTIPLE JOINT PAIN: ICD-10-CM

## 2023-07-19 DIAGNOSIS — Z12.4 CERVICAL CANCER SCREENING: ICD-10-CM

## 2023-07-19 DIAGNOSIS — B96.89 BACTERIAL VAGINOSIS: ICD-10-CM

## 2023-07-19 DIAGNOSIS — N95.1 PERIMENOPAUSE: ICD-10-CM

## 2023-07-19 DIAGNOSIS — M70.62 TROCHANTERIC BURSITIS OF BOTH HIPS: ICD-10-CM

## 2023-07-19 DIAGNOSIS — N76.0 BACTERIAL VAGINOSIS: ICD-10-CM

## 2023-07-19 DIAGNOSIS — M25.552 BILATERAL HIP PAIN: ICD-10-CM

## 2023-07-19 DIAGNOSIS — Z76.89 ENCOUNTER TO ESTABLISH CARE: Primary | ICD-10-CM

## 2023-07-19 DIAGNOSIS — M70.61 TROCHANTERIC BURSITIS OF BOTH HIPS: ICD-10-CM

## 2023-07-19 DIAGNOSIS — M25.551 BILATERAL HIP PAIN: ICD-10-CM

## 2023-07-19 DIAGNOSIS — Z12.11 COLON CANCER SCREENING: ICD-10-CM

## 2023-07-19 PROCEDURE — 99204 OFFICE O/P NEW MOD 45 MIN: CPT

## 2023-07-19 RX ORDER — FLUTICASONE PROPIONATE 50 MCG
SPRAY, SUSPENSION (ML) NASAL
COMMUNITY

## 2023-07-19 RX ORDER — CETIRIZINE HYDROCHLORIDE 10 MG/1
10 TABLET ORAL DAILY
COMMUNITY

## 2023-07-19 RX ORDER — METRONIDAZOLE 500 MG/1
500 TABLET ORAL 2 TIMES DAILY
Qty: 14 TABLET | Refills: 0 | Status: SHIPPED | OUTPATIENT
Start: 2023-07-19 | End: 2023-07-26

## 2023-07-19 RX ORDER — FLUCONAZOLE 150 MG/1
150 TABLET ORAL
Qty: 3 TABLET | Refills: 0 | Status: SHIPPED | OUTPATIENT
Start: 2023-07-19

## 2023-07-19 SDOH — ECONOMIC STABILITY: FOOD INSECURITY: WITHIN THE PAST 12 MONTHS, YOU WORRIED THAT YOUR FOOD WOULD RUN OUT BEFORE YOU GOT MONEY TO BUY MORE.: NEVER TRUE

## 2023-07-19 SDOH — ECONOMIC STABILITY: FOOD INSECURITY: WITHIN THE PAST 12 MONTHS, THE FOOD YOU BOUGHT JUST DIDN'T LAST AND YOU DIDN'T HAVE MONEY TO GET MORE.: NEVER TRUE

## 2023-07-19 SDOH — ECONOMIC STABILITY: HOUSING INSECURITY
IN THE LAST 12 MONTHS, WAS THERE A TIME WHEN YOU DID NOT HAVE A STEADY PLACE TO SLEEP OR SLEPT IN A SHELTER (INCLUDING NOW)?: NO

## 2023-07-19 SDOH — ECONOMIC STABILITY: INCOME INSECURITY: HOW HARD IS IT FOR YOU TO PAY FOR THE VERY BASICS LIKE FOOD, HOUSING, MEDICAL CARE, AND HEATING?: NOT HARD AT ALL

## 2023-07-19 ASSESSMENT — PATIENT HEALTH QUESTIONNAIRE - PHQ9
SUM OF ALL RESPONSES TO PHQ QUESTIONS 1-9: 0
SUM OF ALL RESPONSES TO PHQ QUESTIONS 1-9: 0
1. LITTLE INTEREST OR PLEASURE IN DOING THINGS: 0
SUM OF ALL RESPONSES TO PHQ QUESTIONS 1-9: 0
SUM OF ALL RESPONSES TO PHQ9 QUESTIONS 1 & 2: 0
2. FEELING DOWN, DEPRESSED OR HOPELESS: 0
SUM OF ALL RESPONSES TO PHQ QUESTIONS 1-9: 0

## 2023-07-19 NOTE — PROGRESS NOTES
Maylin Posada is a 56 y.o. presents today for   Chief Complaint   Patient presents with    Establish Care       Is someone accompanying this pt? NO    Is the patient using any DME equipment during OV? NO    Depression Screening:   No flowsheet data found. Abuse Screening: AMB Abuse Screening 7/19/2023   Do you ever feel afraid of your partner? N   Are you in a relationship with someone who physically or mentally threatens you? N   Is it safe for you to go home? Y       Learning Assessment:  Who is the primary learner? Patient    What is the preferred language for health care of the primary learner? ENGLISH    How does the primary learner prefer to learn new concepts? OTHER (COMMENT) HANDS ON   Answered By PATIENT    Relationship to Learner SELF    Highest level of education completed by primary learner? 2 YEARS OF COLLEGE    Are there any barriers / factors that could impact learning? NONE    Will there be a co-learner / caregiver? No        Fall Risk:  No flowsheet data found. Coordination of Care:   1. \"Have you been to the ER, urgent care clinic since your last visit? Hospitalized since your last visit? \" NO    2. \"Have you seen or consulted any other health care providers outside of the [address] [place] [address] since your last visit? \" NO    3. For patients aged 43-73: Has the patient had a colonoscopy / FIT/ Cologuard? NOT YET    If the patient is female:    4. For patients aged 43-66: Has the patient had a mammogram within the past 2 years? HAD ONE IN MAY 2023    5. For patients aged 21-65: Has the patient had a pap smear? DUE    Health Maintenance: reviewed and discussed and ordered per Provider.     Health Maintenance Due   Topic Date Due    COVID-19 Vaccine (1) Never done    Depression Screen  Never done    HIV screen  Never done    Hepatitis C screen  Never done    DTaP/Tdap/Td vaccine (1 - Tdap) Never done    Lipids  Never done    Colorectal Cancer Screen  Never done

## 2023-07-25 LAB
BACTERIAL VAGINOSIS, NAA: NEGATIVE
C TRACH DNA SPEC NAA+PROBE: NEGATIVE
CANDIDA GLABRATA, NAA: NEGATIVE
CANDIDA SPECIES, NAA: NEGATIVE
HERPES 1 (THINPREP / APTIMA SWAB): NEGATIVE
HERPES 2 (THINPREP / APTIMA SWAB): NEGATIVE
NEISSERIA GONORRHOEAE, NAA: NEGATIVE
TRICHOMONAS VAGINALIS BY NAA: NEGATIVE

## 2023-07-26 LAB
ANION GAP SERPL CALCULATED.3IONS-SCNC: 12 MMOL/L (ref 3–15)
ANTI-DNA (DS) AB QN: 1 IU/ML
BACTERIA: NEGATIVE
BASOPHILS # BLD: 1 % (ref 0–2)
BASOPHILS ABSOLUTE: 0.1 K/UL (ref 0–0.2)
BILIRUB SERPL-MCNC: NEGATIVE MG/DL
BLOOD: NEGATIVE
BUN BLDV-MCNC: 10 MG/DL (ref 6–22)
CALCIUM SERPL-MCNC: 9.6 MG/DL (ref 8.4–10.5)
CENTROMERE B AB: <0.2 AI
CHLAMYDIA AMPLIFIED URINE: NEGATIVE
CHLORIDE BLD-SCNC: 103 MMOL/L (ref 98–110)
CHOLESTEROL/HDL RATIO: 2.2 (ref 0–5)
CHOLESTEROL: 168 MG/DL (ref 110–200)
CHROMATIN ANTIBODY: <0.2 AI
CLARITY: CLEAR
CO2: 24 MMOL/L (ref 20–32)
COLOR: YELLOW
CREAT SERPL-MCNC: 0.7 MG/DL (ref 0.5–1.2)
EOSINOPHIL # BLD: 3 % (ref 0–6)
EOSINOPHILS ABSOLUTE: 0.1 K/UL (ref 0–0.5)
EPITHELIAL CELLS: NORMAL /HPF
GC AMPLIFIED URINE: NEGATIVE
GLOMERULAR FILTRATION RATE: >60 ML/MIN/1.73 SQ.M.
GLUCOSE: 103 MG/DL (ref 70–99)
GLUCOSE: NEGATIVE MG/DL
HCT VFR BLD CALC: 43.2 % (ref 35.1–48)
HDLC SERPL-MCNC: 77 MG/DL
HEMOGLOBIN: 14 G/DL (ref 11.7–16)
HEPATITIS C ANTIBODY: NORMAL
HIV -1/0/2 AG/AB WITH REFLEX: NON REACTIVE
HIV INTERPRETATION: NORMAL
HYALINE CASTS: NORMAL /LPF (ref 0–2)
JO-1 ANTIBODY: <0.2 AI
KETONES, URINE: NEGATIVE MG/DL
LDL CHOLESTEROL CALCULATED: 79 MG/DL (ref 50–99)
LDL/HDL RATIO: 1
LEUKOCYTE ESTERASE, URINE: NEGATIVE
LYMPHOCYTES # BLD: 33 % (ref 20–45)
LYMPHOCYTES ABSOLUTE: 1.3 K/UL (ref 1–4.8)
MCH RBC QN AUTO: 31 PG (ref 26–34)
MCHC RBC AUTO-ENTMCNC: 32 G/DL (ref 31–36)
MCV RBC AUTO: 96 FL (ref 80–99)
MONOCYTES ABSOLUTE: 0.3 K/UL (ref 0.1–1)
MONOCYTES: 8 % (ref 3–12)
NEUTROPHILS ABSOLUTE: 2.1 K/UL (ref 1.8–7.7)
NEUTROPHILS: 54 % (ref 40–75)
NITRITE, URINE: NEGATIVE
NON-HDL CHOLESTEROL: 91 MG/DL
PDW BLD-RTO: 12.9 % (ref 10–15.5)
PH, URINE: 7 PH (ref 5–8)
PLATELET # BLD: 260 K/UL (ref 140–440)
PMV BLD AUTO: 10.5 FL (ref 9–13)
POTASSIUM SERPL-SCNC: 4.6 MMOL/L (ref 3.5–5.5)
PROTEIN UA: NEGATIVE MG/DL
RBC URINE: NORMAL /HPF
RBC: 4.51 M/UL (ref 3.8–5.2)
RNP ANTIBODY: 0.3 AI
SCLERODERMA (SCL-70) AB: 0.4 AI
SJOGREN'S ANTI-SS-A: <0.2 AI
SJOGREN'S ANTI-SS-B: <0.2 AI
SMITH ABS: <0.2 AI
SODIUM BLD-SCNC: 139 MMOL/L (ref 133–145)
SPECIFIC GRAVITY: 1 (ref 1–1.03)
TRIGL SERPL-MCNC: 61 MG/DL (ref 40–149)
TSH SERPL DL<=0.05 MIU/L-ACNC: 1.02 MCU/ML (ref 0.27–4.2)
UROBILINOGEN: 0.2 MG/DL
VLDLC SERPL CALC-MCNC: 12 MG/DL (ref 8–30)
WBC UA: NORMAL /HPF (ref 0–5)
WBC: 3.8 K/UL (ref 4–11)

## 2023-07-31 LAB
CHLAMYDIA TRACHOMATIS THINPREP: NEGATIVE
HPV DNA HIGH RISK: NEGATIVE
HPV GENOTYPE: NEGATIVE
HPV, GENOTYPE 18: NEGATIVE
NEISSERIA GONORRHOEAE THINPREP: NEGATIVE
PAP IMAGE GUIDED: NORMAL
TRICHOMONAS NUC AMP-THIN PREP: NEGATIVE

## 2024-05-28 ENCOUNTER — HOSPITAL ENCOUNTER (OUTPATIENT)
Dept: WOMENS IMAGING | Facility: HOSPITAL | Age: 49
Discharge: HOME | End: 2024-05-31
Payer: COMMERCIAL

## 2024-05-28 VITALS — WEIGHT: 145 LBS | HEIGHT: 67 IN | BODY MASS INDEX: 22.76 KG/M2

## 2024-05-28 DIAGNOSIS — Z12.31 VISIT FOR SCREENING MAMMOGRAM: ICD-10-CM

## 2024-05-28 PROCEDURE — 77063 BREAST TOMOSYNTHESIS BI: CPT

## 2024-07-19 ENCOUNTER — OFFICE VISIT (OUTPATIENT)
Facility: CLINIC | Age: 49
End: 2024-07-19
Payer: COMMERCIAL

## 2024-07-19 VITALS
TEMPERATURE: 98.3 F | DIASTOLIC BLOOD PRESSURE: 75 MMHG | HEIGHT: 66 IN | WEIGHT: 148 LBS | BODY MASS INDEX: 23.78 KG/M2 | RESPIRATION RATE: 15 BRPM | HEART RATE: 100 BPM | OXYGEN SATURATION: 98 % | SYSTOLIC BLOOD PRESSURE: 125 MMHG

## 2024-07-19 DIAGNOSIS — E55.9 VITAMIN D DEFICIENCY: ICD-10-CM

## 2024-07-19 DIAGNOSIS — Z00.00 ANNUAL PHYSICAL EXAM: Primary | ICD-10-CM

## 2024-07-19 DIAGNOSIS — F41.1 GAD (GENERALIZED ANXIETY DISORDER): ICD-10-CM

## 2024-07-19 DIAGNOSIS — K21.9 GASTROESOPHAGEAL REFLUX DISEASE, UNSPECIFIED WHETHER ESOPHAGITIS PRESENT: ICD-10-CM

## 2024-07-19 PROCEDURE — 99396 PREV VISIT EST AGE 40-64: CPT

## 2024-07-19 RX ORDER — BUSPIRONE HYDROCHLORIDE 5 MG/1
5 TABLET ORAL 2 TIMES DAILY
Qty: 60 TABLET | Refills: 0 | Status: SHIPPED | OUTPATIENT
Start: 2024-07-19 | End: 2024-08-18

## 2024-07-19 RX ORDER — VENLAFAXINE HYDROCHLORIDE 37.5 MG/1
37.5 CAPSULE, EXTENDED RELEASE ORAL DAILY
Qty: 30 CAPSULE | Refills: 3 | Status: SHIPPED | OUTPATIENT
Start: 2024-07-19

## 2024-07-19 RX ORDER — PANTOPRAZOLE SODIUM 20 MG/1
20 TABLET, DELAYED RELEASE ORAL DAILY
Qty: 30 TABLET | Refills: 3 | Status: SHIPPED | OUTPATIENT
Start: 2024-07-19

## 2024-07-19 SDOH — ECONOMIC STABILITY: INCOME INSECURITY: HOW HARD IS IT FOR YOU TO PAY FOR THE VERY BASICS LIKE FOOD, HOUSING, MEDICAL CARE, AND HEATING?: NOT HARD AT ALL

## 2024-07-19 SDOH — ECONOMIC STABILITY: FOOD INSECURITY: WITHIN THE PAST 12 MONTHS, THE FOOD YOU BOUGHT JUST DIDN'T LAST AND YOU DIDN'T HAVE MONEY TO GET MORE.: NEVER TRUE

## 2024-07-19 SDOH — ECONOMIC STABILITY: FOOD INSECURITY: WITHIN THE PAST 12 MONTHS, YOU WORRIED THAT YOUR FOOD WOULD RUN OUT BEFORE YOU GOT MONEY TO BUY MORE.: NEVER TRUE

## 2024-07-19 ASSESSMENT — ANXIETY QUESTIONNAIRES
2. NOT BEING ABLE TO STOP OR CONTROL WORRYING: NEARLY EVERY DAY
6. BECOMING EASILY ANNOYED OR IRRITABLE: SEVERAL DAYS
5. BEING SO RESTLESS THAT IT IS HARD TO SIT STILL: NOT AT ALL
IF YOU CHECKED OFF ANY PROBLEMS ON THIS QUESTIONNAIRE, HOW DIFFICULT HAVE THESE PROBLEMS MADE IT FOR YOU TO DO YOUR WORK, TAKE CARE OF THINGS AT HOME, OR GET ALONG WITH OTHER PEOPLE: NOT DIFFICULT AT ALL
3. WORRYING TOO MUCH ABOUT DIFFERENT THINGS: NEARLY EVERY DAY
7. FEELING AFRAID AS IF SOMETHING AWFUL MIGHT HAPPEN: SEVERAL DAYS
1. FEELING NERVOUS, ANXIOUS, OR ON EDGE: NEARLY EVERY DAY
4. TROUBLE RELAXING: SEVERAL DAYS
GAD7 TOTAL SCORE: 12

## 2024-07-19 ASSESSMENT — PATIENT HEALTH QUESTIONNAIRE - PHQ9
SUM OF ALL RESPONSES TO PHQ QUESTIONS 1-9: 0
SUM OF ALL RESPONSES TO PHQ QUESTIONS 1-9: 0
SUM OF ALL RESPONSES TO PHQ9 QUESTIONS 1 & 2: 0
SUM OF ALL RESPONSES TO PHQ QUESTIONS 1-9: 0
SUM OF ALL RESPONSES TO PHQ QUESTIONS 1-9: 0
1. LITTLE INTEREST OR PLEASURE IN DOING THINGS: NOT AT ALL
2. FEELING DOWN, DEPRESSED OR HOPELESS: NOT AT ALL

## 2024-07-19 NOTE — PROGRESS NOTES
Ryne Bishop is a 48 y.o. year old female who presents today for   Chief Complaint   Patient presents with    Annual Exam       Is someone accompanying this pt? no    Is the patient using any DME equipment during OV? no    Depression Screenin/19/2024     1:05 PM 2023     1:53 PM   PHQ-9 Questionaire   Little interest or pleasure in doing things 0 0   Feeling down, depressed, or hopeless 0 0   PHQ-9 Total Score 0 0       Abuse Screenin/19/2024     1:00 PM 2023     1:00 PM   AMB Abuse Screening   Do you ever feel afraid of your partner? N N   Are you in a relationship with someone who physically or mentally threatens you? N N   Is it safe for you to go home? Y Y       Learning Assessment:  No question data found.    Fall Risk:       No data to display                    Coordination of Care:   1. \"Have you been to the ER, urgent care clinic since your last visit?  Hospitalized since your last visit?\" no    2. \"Have you seen or consulted any other health care providers outside of the [facility] since your last visit?\" no    3. For patients aged 45-75: Has the patient had a colonoscopy / FIT/ Cologuard? due    If the patient is female:    4. For patients aged 40-74: Has the patient had a mammogram within the past 2 years? NA    5. For patients aged 21-65: Has the patient had a pap smear? NA    Health Maintenance: reviewed and discussed and ordered per Provider.    Health Maintenance Due   Topic Date Due    Hepatitis B vaccine (1 of 3 - 3-dose series) Never done    COVID-19 Vaccine (1) Never done    DTaP/Tdap/Td vaccine (1 - Tdap) Never done    Colorectal Cancer Screen  Never done    Depression Screen  2024        - Jessenia Chow LPN  [facility]  Phone: [phone]  Fax: [phone]

## 2024-07-19 NOTE — PROGRESS NOTES
Patient ID: Ryne Bishop is a 48 y.o. female established patient presents for the following:      Subjective:     Primary historian: patient    Annual Exam and Anxiety         7/19/2024     1:05 PM 7/19/2023     1:53 PM   PHQ-9    Little interest or pleasure in doing things 0 0   Feeling down, depressed, or hopeless 0 0   PHQ-2 Score 0 0   PHQ-9 Total Score 0 0          7/19/2024     1:27 PM   NADYA-7 SCREENING   Feeling nervous, anxious, or on edge Nearly every day   Not being able to stop or control worrying Nearly every day   Worrying too much about different things Nearly every day   Trouble relaxing Several days   Being so restless that it is hard to sit still Not at all   Becoming easily annoyed or irritable Several days   Feeling afraid as if something awful might happen Several days   NADYA-7 Total Score 12   How difficult have these problems made it for you to do your work, take care of things at home, or get along with other people? Not difficult at all        No past medical history on file.    Past Surgical History:   Procedure Laterality Date    IMPLANT BREAST SILICONE/EQ  2006       Current Outpatient Medications   Medication Sig Dispense Refill    Drospirenone-Estetrol (NEXTSTELLIS PO) Take by mouth      busPIRone (BUSPAR) 5 MG tablet Take 1 tablet by mouth 2 times daily 60 tablet 0    venlafaxine (EFFEXOR XR) 37.5 MG extended release capsule Take 1 capsule by mouth daily 30 capsule 3    pantoprazole (PROTONIX) 20 MG tablet Take 1 tablet by mouth daily 30 tablet 3    cetirizine (ZYRTEC) 10 MG tablet Take 1 tablet by mouth daily AS NEEDED      fluticasone (FLONASE) 50 MCG/ACT nasal spray by Nasal route AS NEEDED      fluconazole (DIFLUCAN) 150 MG tablet Take 1 tablet by mouth every 72 hours (Patient not taking: Reported on 7/19/2024) 3 tablet 0     No current facility-administered medications for this visit.          ROS   Review of Systems   Constitutional:  Negative for chills, fatigue and

## 2024-07-27 LAB
A/G RATIO: 2.4 RATIO (ref 1.1–2.6)
ALBUMIN: 4.5 G/DL (ref 3.5–5)
ALP BLD-CCNC: 54 U/L (ref 25–115)
ALT SERPL-CCNC: 10 U/L (ref 5–40)
ANION GAP SERPL CALCULATED.3IONS-SCNC: 10 MMOL/L (ref 3–15)
AST SERPL-CCNC: 11 U/L (ref 10–37)
BACTERIA: NEGATIVE
BASOPHILS ABSOLUTE: 0 K/UL (ref 0–0.2)
BASOPHILS RELATIVE PERCENT: 1 % (ref 0–2)
BILIRUB SERPL-MCNC: 0.4 MG/DL (ref 0.2–1.2)
BILIRUB SERPL-MCNC: NEGATIVE MG/DL
BLOOD: NEGATIVE
BUN BLDV-MCNC: 10 MG/DL (ref 6–22)
CALCIUM SERPL-MCNC: 9.2 MG/DL (ref 8.4–10.5)
CHLORIDE BLD-SCNC: 106 MMOL/L (ref 98–110)
CHOLESTEROL, TOTAL: 187 MG/DL (ref 110–200)
CHOLESTEROL/HDL RATIO: 3 (ref 0–5)
CLARITY, UA: CLEAR
CO2: 24 MMOL/L (ref 20–32)
COLOR, UA: YELLOW
CREAT SERPL-MCNC: 0.7 MG/DL (ref 0.5–1.2)
EOSINOPHIL # BLD: 2 % (ref 0–6)
EOSINOPHILS ABSOLUTE: 0.1 K/UL (ref 0–0.5)
EPITHELIAL CELLS: ABNORMAL /HPF
ESTIMATED AVERAGE GLUCOSE: 107 MG/DL (ref 91–123)
GFR, ESTIMATED: >60 ML/MIN/1.73 SQ.M.
GLOBULIN: 1.9 G/DL (ref 2–4)
GLUCOSE: 102 MG/DL (ref 70–99)
GLUCOSE: NEGATIVE MG/DL
HBA1C MFR BLD: 5.3 % (ref 4.8–5.6)
HCT VFR BLD CALC: 40.9 % (ref 35.1–48)
HDLC SERPL-MCNC: 62 MG/DL
HEMOGLOBIN: 13.7 G/DL (ref 11.7–16)
HYALINE CASTS: ABNORMAL /LPF (ref 0–2)
KETONES, URINE: NEGATIVE MG/DL
LDL CHOLESTEROL: 110 MG/DL (ref 50–99)
LDL/HDL RATIO: 1.8
LEUKOCYTE ESTERASE, URINE: NEGATIVE
LYMPHOCYTES # BLD: 31 % (ref 20–45)
LYMPHOCYTES ABSOLUTE: 1.3 K/UL (ref 1–4.8)
MCH RBC QN AUTO: 31 PG (ref 26–34)
MCHC RBC AUTO-ENTMCNC: 34 G/DL (ref 31–36)
MCV RBC AUTO: 93 FL (ref 80–99)
MONOCYTES ABSOLUTE: 0.3 K/UL (ref 0.1–1)
MONOCYTES: 6 % (ref 3–12)
NEUTROPHILS ABSOLUTE: 2.6 K/UL (ref 1.8–7.7)
NEUTROPHILS: 60 % (ref 40–75)
NITRITE, URINE: NEGATIVE
NON-HDL CHOLESTEROL: 125 MG/DL
PDW BLD-RTO: 11.7 % (ref 10–15.5)
PH, URINE: 7 PH (ref 5–8)
PLATELET # BLD: 279 K/UL (ref 140–440)
PMV BLD AUTO: 10.2 FL (ref 9–13)
POTASSIUM SERPL-SCNC: 4.5 MMOL/L (ref 3.5–5.5)
PROTEIN UA: NEGATIVE MG/DL
RBC # BLD: 4.41 M/UL (ref 3.8–5.2)
RBC URINE: ABNORMAL /HPF
SODIUM BLD-SCNC: 140 MMOL/L (ref 133–145)
SPECIFIC GRAVITY UA: 1 (ref 1–1.03)
TOTAL PROTEIN: 6.4 G/DL (ref 6.4–8.3)
TRIGL SERPL-MCNC: 75 MG/DL (ref 40–149)
TSH SERPL DL<=0.05 MIU/L-ACNC: 1.01 MCU/ML (ref 0.27–4.2)
UROBILINOGEN, URINE: 0.2 MG/DL
VITAMIN D 25-HYDROXY: 74.7 NG/ML (ref 32–100)
VLDLC SERPL CALC-MCNC: 15 MG/DL (ref 8–30)
WBC # BLD: 4.3 K/UL (ref 4–11)
WBC UA: ABNORMAL /HPF (ref 0–5)

## 2024-07-29 ASSESSMENT — ENCOUNTER SYMPTOMS
NAUSEA: 0
DIARRHEA: 0
EYES NEGATIVE: 1
ABDOMINAL PAIN: 0
VOMITING: 0
CHEST TIGHTNESS: 0
TROUBLE SWALLOWING: 0
BLOOD IN STOOL: 0
WHEEZING: 0
SORE THROAT: 0
COUGH: 0
CONSTIPATION: 0
SHORTNESS OF BREATH: 0

## 2024-08-14 DIAGNOSIS — F41.1 GAD (GENERALIZED ANXIETY DISORDER): ICD-10-CM

## 2024-08-14 NOTE — TELEPHONE ENCOUNTER
Medication(s) requesting:   Requested Prescriptions     Pending Prescriptions Disp Refills    busPIRone (BUSPAR) 5 MG tablet 60 tablet 0     Sig: Take 1 tablet by mouth 2 times daily       Last office visit:  07/19/2024  Next office visit DMA: 9/27/2024

## 2024-08-15 RX ORDER — BUSPIRONE HYDROCHLORIDE 5 MG/1
5 TABLET ORAL 2 TIMES DAILY
Qty: 60 TABLET | Refills: 0 | Status: SHIPPED | OUTPATIENT
Start: 2024-08-15 | End: 2024-09-14

## 2024-09-27 ENCOUNTER — TELEMEDICINE (OUTPATIENT)
Facility: CLINIC | Age: 49
End: 2024-09-27
Payer: COMMERCIAL

## 2024-09-27 DIAGNOSIS — F41.1 GAD (GENERALIZED ANXIETY DISORDER): Primary | ICD-10-CM

## 2024-09-27 PROCEDURE — 99213 OFFICE O/P EST LOW 20 MIN: CPT

## 2024-09-27 RX ORDER — BUSPIRONE HYDROCHLORIDE 10 MG/1
10 TABLET ORAL 2 TIMES DAILY
Qty: 180 TABLET | Refills: 3 | Status: SHIPPED | OUTPATIENT
Start: 2024-09-27 | End: 2025-09-22

## 2024-09-27 RX ORDER — BUSPIRONE HYDROCHLORIDE 10 MG/1
10 TABLET ORAL 2 TIMES DAILY
Qty: 60 TABLET | Refills: 3 | Status: SHIPPED | OUTPATIENT
Start: 2024-09-27 | End: 2024-09-27

## 2024-09-27 ASSESSMENT — ANXIETY QUESTIONNAIRES
2. NOT BEING ABLE TO STOP OR CONTROL WORRYING: NEARLY EVERY DAY
4. TROUBLE RELAXING: NOT AT ALL
3. WORRYING TOO MUCH ABOUT DIFFERENT THINGS: NEARLY EVERY DAY
6. BECOMING EASILY ANNOYED OR IRRITABLE: NOT AT ALL
IF YOU CHECKED OFF ANY PROBLEMS ON THIS QUESTIONNAIRE, HOW DIFFICULT HAVE THESE PROBLEMS MADE IT FOR YOU TO DO YOUR WORK, TAKE CARE OF THINGS AT HOME, OR GET ALONG WITH OTHER PEOPLE: SOMEWHAT DIFFICULT
1. FEELING NERVOUS, ANXIOUS, OR ON EDGE: MORE THAN HALF THE DAYS
7. FEELING AFRAID AS IF SOMETHING AWFUL MIGHT HAPPEN: SEVERAL DAYS
GAD7 TOTAL SCORE: 9
5. BEING SO RESTLESS THAT IT IS HARD TO SIT STILL: NOT AT ALL

## 2024-09-27 NOTE — PROGRESS NOTES
Ryne Bishop (: 1975) is a 48 y.o. female, established  patient, here for evaluation of the following:      Subjective:     Primary historian: patient    Anxiety       HPI      2024    12:20 PM 2024     1:27 PM   NADYA-7 SCREENING   Feeling nervous, anxious, or on edge More than half the days Nearly every day   Not being able to stop or control worrying Nearly every day Nearly every day   Worrying too much about different things Nearly every day Nearly every day   Trouble relaxing Not at all Several days   Being so restless that it is hard to sit still Not at all Not at all   Becoming easily annoyed or irritable Not at all Several days   Feeling afraid as if something awful might happen Several days Several days   NADYA-7 Total Score 9 12   How difficult have these problems made it for you to do your work, take care of things at home, or get along with other people? Somewhat difficult Not difficult at all        She has not taken effexor yet.     No past medical history on file.     Past Surgical History:   Procedure Laterality Date    IMPLANT BREAST SILICONE/EQ          Current Outpatient Medications   Medication Sig Dispense Refill    busPIRone (BUSPAR) 10 MG tablet Take 1 tablet by mouth 2 times daily 180 tablet 3    Drospirenone-Estetrol (NEXTSTELLIS PO) Take by mouth      venlafaxine (EFFEXOR XR) 37.5 MG extended release capsule Take 1 capsule by mouth daily 30 capsule 3    pantoprazole (PROTONIX) 20 MG tablet Take 1 tablet by mouth daily 30 tablet 3    fluticasone (FLONASE) 50 MCG/ACT nasal spray by Nasal route AS NEEDED      cetirizine (ZYRTEC) 10 MG tablet Take 1 tablet by mouth daily AS NEEDED      fluconazole (DIFLUCAN) 150 MG tablet Take 1 tablet by mouth every 72 hours (Patient not taking: Reported on 2024) 3 tablet 0     No current facility-administered medications for this visit.       ROS:    Review of Systems   Constitutional:  Negative for chills, fatigue and

## 2024-10-14 ASSESSMENT — ENCOUNTER SYMPTOMS
EYES NEGATIVE: 1
BLOOD IN STOOL: 0
WHEEZING: 0
SHORTNESS OF BREATH: 0
VOMITING: 0
TROUBLE SWALLOWING: 0
DIARRHEA: 0
NAUSEA: 0
CHEST TIGHTNESS: 0
ABDOMINAL PAIN: 0
SORE THROAT: 0
COUGH: 0
CONSTIPATION: 0

## 2024-12-05 ENCOUNTER — COMMUNITY OUTREACH (OUTPATIENT)
Facility: CLINIC | Age: 49
End: 2024-12-05

## 2025-03-13 DIAGNOSIS — F41.1 GAD (GENERALIZED ANXIETY DISORDER): ICD-10-CM

## 2025-03-13 RX ORDER — BUSPIRONE HYDROCHLORIDE 10 MG/1
10 TABLET ORAL 2 TIMES DAILY
Qty: 180 TABLET | Refills: 3 | Status: SHIPPED | OUTPATIENT
Start: 2025-03-13 | End: 2026-03-08

## 2025-03-13 NOTE — TELEPHONE ENCOUNTER
Pharmacy refill request recieved for        busPIRone (BUSPAR) 10 MG tablet     LOV: 07/19/2024  NOV:none scheduled      Please advise    Thank you

## 2025-06-02 ENCOUNTER — HOSPITAL ENCOUNTER (OUTPATIENT)
Dept: WOMENS IMAGING | Facility: HOSPITAL | Age: 50
Discharge: HOME | End: 2025-06-05
Payer: COMMERCIAL

## 2025-06-02 DIAGNOSIS — Z12.31 ENCOUNTER FOR SCREENING MAMMOGRAM FOR MALIGNANT NEOPLASM OF BREAST: ICD-10-CM

## 2025-06-02 PROCEDURE — 77063 BREAST TOMOSYNTHESIS BI: CPT

## 2025-07-21 ENCOUNTER — OFFICE VISIT (OUTPATIENT)
Facility: CLINIC | Age: 50
End: 2025-07-21
Payer: COMMERCIAL

## 2025-07-21 VITALS
BODY MASS INDEX: 24.33 KG/M2 | DIASTOLIC BLOOD PRESSURE: 80 MMHG | TEMPERATURE: 98 F | HEART RATE: 80 BPM | RESPIRATION RATE: 15 BRPM | HEIGHT: 67 IN | OXYGEN SATURATION: 98 % | SYSTOLIC BLOOD PRESSURE: 113 MMHG | WEIGHT: 155 LBS

## 2025-07-21 DIAGNOSIS — Z13.89 SCREENING FOR BLOOD OR PROTEIN IN URINE: ICD-10-CM

## 2025-07-21 DIAGNOSIS — Z00.00 ENCOUNTER FOR WELL ADULT EXAM WITHOUT ABNORMAL FINDINGS: Primary | ICD-10-CM

## 2025-07-21 DIAGNOSIS — M25.531 BILATERAL WRIST PAIN: ICD-10-CM

## 2025-07-21 DIAGNOSIS — M25.532 BILATERAL WRIST PAIN: ICD-10-CM

## 2025-07-21 DIAGNOSIS — E55.9 VITAMIN D DEFICIENCY: ICD-10-CM

## 2025-07-21 DIAGNOSIS — F41.1 GAD (GENERALIZED ANXIETY DISORDER): ICD-10-CM

## 2025-07-21 DIAGNOSIS — Z13.29 SCREENING FOR THYROID DISORDER: ICD-10-CM

## 2025-07-21 DIAGNOSIS — Z13.1 SCREENING FOR DIABETES MELLITUS: ICD-10-CM

## 2025-07-21 DIAGNOSIS — Z01.84 IMMUNITY STATUS TESTING: ICD-10-CM

## 2025-07-21 DIAGNOSIS — Z13.220 SCREENING FOR LIPID DISORDERS: ICD-10-CM

## 2025-07-21 DIAGNOSIS — Z12.11 SCREEN FOR COLON CANCER: ICD-10-CM

## 2025-07-21 PROCEDURE — 99213 OFFICE O/P EST LOW 20 MIN: CPT

## 2025-07-21 PROCEDURE — 99396 PREV VISIT EST AGE 40-64: CPT

## 2025-07-21 RX ORDER — ESTRADIOL 0.05 MG/D
PATCH TRANSDERMAL
COMMUNITY
Start: 2025-05-15

## 2025-07-21 RX ORDER — PROGESTERONE 100 MG/1
CAPSULE ORAL
COMMUNITY
Start: 2025-07-13

## 2025-07-21 SDOH — ECONOMIC STABILITY: FOOD INSECURITY: WITHIN THE PAST 12 MONTHS, YOU WORRIED THAT YOUR FOOD WOULD RUN OUT BEFORE YOU GOT MONEY TO BUY MORE.: NEVER TRUE

## 2025-07-21 SDOH — ECONOMIC STABILITY: FOOD INSECURITY: WITHIN THE PAST 12 MONTHS, THE FOOD YOU BOUGHT JUST DIDN'T LAST AND YOU DIDN'T HAVE MONEY TO GET MORE.: NEVER TRUE

## 2025-07-21 ASSESSMENT — PATIENT HEALTH QUESTIONNAIRE - PHQ9
SUM OF ALL RESPONSES TO PHQ QUESTIONS 1-9: 0
1. LITTLE INTEREST OR PLEASURE IN DOING THINGS: NOT AT ALL
2. FEELING DOWN, DEPRESSED OR HOPELESS: NOT AT ALL
SUM OF ALL RESPONSES TO PHQ QUESTIONS 1-9: 0

## 2025-07-21 ASSESSMENT — ENCOUNTER SYMPTOMS
ABDOMINAL PAIN: 0
TROUBLE SWALLOWING: 0
CONSTIPATION: 0
BLOOD IN STOOL: 0
SHORTNESS OF BREATH: 0
SORE THROAT: 0
NAUSEA: 0
EYES NEGATIVE: 1
DIARRHEA: 0
WHEEZING: 0
COUGH: 0
CHEST TIGHTNESS: 0
VOMITING: 0

## 2025-07-21 ASSESSMENT — VISUAL ACUITY: OU: 1

## 2025-07-21 NOTE — PROGRESS NOTES
Ryne Bishop is a 49 y.o. year old female who presents today for   Chief Complaint   Patient presents with    Annual Exam        \"Have you been to the ER, urgent care clinic since your last visit?  Hospitalized since your last visit?\"   NO     “Have you seen or consulted any other health care providers outside our system since your last visit?”   NO       “Have you had a colorectal cancer screening such as a colonoscopy/FIT/Cologuard?    NO    No colonoscopy on file  No cologuard on file  No FIT/FOBT on file   No flexible sigmoidoscopy on file           - OZZY Jimenez  [facility]  Phone: [phone]  Fax: [phone]

## 2025-07-21 NOTE — PROGRESS NOTES
Well Adult Note  Name: Ryne Bishop Today’s Date: 2025   MRN: [id number] Sex: Female   Age: 49 y.o. Ethnicity: Non- / Non    : 1975 Race: White (non-)      Ryne Bishop is here for a well adult exam.       Assessment & Plan   Encounter for well adult exam without abnormal findings  NADYA (generalized anxiety disorder)  -     Comprehensive Metabolic Panel; Future  -     CBC with Auto Differential; Future  Screening for lipid disorders  -     Lipid Panel; Future  Screening for diabetes mellitus  -     Hemoglobin A1C; Future  Screening for thyroid disorder  -     T4, Free; Future  -     TSH; Future  Screening for blood or protein in urine  -     Urinalysis with Microscopic; Future  Vitamin D deficiency  -     Vitamin D 25 Hydroxy; Future  Immunity status testing  -     Hepatitis B Surface Antibody; Future  Screen for colon cancer  -     External Referral To Gastroenterology  Bilateral wrist pain  -     BS - Harrison Valentine DO, Orthopedic Surgery(Hand, Elbow & Wrist), [place]      Return in about 1 year (around 2026) for Phyiscal        , labs 1-2 weeks before.       Subjective       Review of Systems   Constitutional:  Negative for chills, fatigue and fever.   HENT:  Negative for ear pain, hearing loss, sore throat and trouble swallowing.    Eyes: Negative.    Respiratory:  Negative for cough, chest tightness, shortness of breath and wheezing.    Cardiovascular:  Negative for chest pain, palpitations and leg swelling.   Gastrointestinal:  Negative for abdominal pain, blood in stool, constipation, diarrhea, nausea and vomiting.   Endocrine: Negative for polydipsia, polyphagia and polyuria.   Genitourinary:  Negative for flank pain, hematuria, pelvic pain, vaginal bleeding, vaginal discharge and vaginal pain.   Musculoskeletal:  Positive for arthralgias.   Skin: Negative.    Neurological:  Negative for dizziness, syncope, weakness and

## 2025-09-05 ENCOUNTER — OFFICE VISIT (OUTPATIENT)
Age: 50
End: 2025-09-05

## 2025-09-05 VITALS — HEIGHT: 67 IN | WEIGHT: 155 LBS | BODY MASS INDEX: 24.33 KG/M2

## 2025-09-05 DIAGNOSIS — M25.531 BILATERAL WRIST PAIN: ICD-10-CM

## 2025-09-05 DIAGNOSIS — M77.21 INFLAMMATION AROUND WRIST, RIGHT: ICD-10-CM

## 2025-09-05 DIAGNOSIS — M65.931 EXTENSOR TENOSYNOVITIS OF RIGHT WRIST: Primary | ICD-10-CM

## 2025-09-05 DIAGNOSIS — M25.532 BILATERAL WRIST PAIN: ICD-10-CM

## 2025-09-05 RX ORDER — LIDOCAINE HYDROCHLORIDE 10 MG/ML
0.5 INJECTION, SOLUTION INFILTRATION; PERINEURAL ONCE
Status: COMPLETED | OUTPATIENT
Start: 2025-09-05 | End: 2025-09-05

## 2025-09-05 RX ORDER — TRIAMCINOLONE ACETONIDE 10 MG/ML
5 INJECTION, SUSPENSION INTRA-ARTICULAR; INTRALESIONAL ONCE
Status: COMPLETED | OUTPATIENT
Start: 2025-09-05 | End: 2025-09-05

## 2025-09-05 RX ORDER — DICLOFENAC SODIUM 75 MG/1
75 TABLET, DELAYED RELEASE ORAL EVERY 12 HOURS PRN
Qty: 30 TABLET | Refills: 1 | Status: SHIPPED | OUTPATIENT
Start: 2025-09-05

## 2025-09-05 RX ADMIN — LIDOCAINE HYDROCHLORIDE 0.5 ML: 10 INJECTION, SOLUTION INFILTRATION; PERINEURAL at 09:12

## 2025-09-05 RX ADMIN — TRIAMCINOLONE ACETONIDE 5 MG: 10 INJECTION, SUSPENSION INTRA-ARTICULAR; INTRALESIONAL at 09:14
